# Patient Record
Sex: MALE | Race: WHITE | NOT HISPANIC OR LATINO | Employment: OTHER | URBAN - METROPOLITAN AREA
[De-identification: names, ages, dates, MRNs, and addresses within clinical notes are randomized per-mention and may not be internally consistent; named-entity substitution may affect disease eponyms.]

---

## 2017-02-28 ENCOUNTER — OFFICE VISIT (OUTPATIENT)
Dept: AUDIOLOGY | Facility: CLINIC | Age: 67
End: 2017-02-28

## 2017-02-28 DIAGNOSIS — H90.71 MIXED HEARING LOSS OF RIGHT EAR: ICD-10-CM

## 2017-02-28 DIAGNOSIS — H90.5 SENSORINEURAL HEARING LOSS OF LEFT EAR: ICD-10-CM

## 2017-05-24 ENCOUNTER — OFFICE VISIT (OUTPATIENT)
Dept: OPHTHALMOLOGY | Facility: CLINIC | Age: 67
End: 2017-05-24
Attending: OPHTHALMOLOGY
Payer: COMMERCIAL

## 2017-05-24 DIAGNOSIS — Z96.1 PSEUDOPHAKIA OF BOTH EYES: ICD-10-CM

## 2017-05-24 DIAGNOSIS — H52.13 MYOPIA WITH ASTIGMATISM AND PRESBYOPIA, BILATERAL: Primary | ICD-10-CM

## 2017-05-24 DIAGNOSIS — H52.203 MYOPIA WITH ASTIGMATISM AND PRESBYOPIA, BILATERAL: Primary | ICD-10-CM

## 2017-05-24 DIAGNOSIS — H52.4 MYOPIA WITH ASTIGMATISM AND PRESBYOPIA, BILATERAL: Primary | ICD-10-CM

## 2017-05-24 PROCEDURE — 99212 OFFICE O/P EST SF 10 MIN: CPT | Mod: ZF

## 2017-05-24 ASSESSMENT — REFRACTION_WEARINGRX
OS_CYLINDER: +0.50
OD_CYLINDER: +0.25
OD_SPHERE: -1.50
OS_AXIS: 180
OD_ADD: +2.50
OD_AXIS: 062
OS_ADD: +2.50
OS_SPHERE: -1.00

## 2017-05-24 ASSESSMENT — VISUAL ACUITY
METHOD: SNELLEN - LINEAR
OD_CC: 20/20
OS_CC: 20/20
METHOD_MR: DEFERS
CORRECTION_TYPE: GLASSES

## 2017-05-24 ASSESSMENT — TONOMETRY
OS_IOP_MMHG: 10
IOP_METHOD: TONOPEN
OD_IOP_MMHG: 12

## 2017-05-24 ASSESSMENT — CUP TO DISC RATIO
OS_RATIO: 0.2
OD_RATIO: 0.2

## 2017-05-24 ASSESSMENT — CONF VISUAL FIELD
OD_NORMAL: 1
OS_NORMAL: 1

## 2017-05-24 ASSESSMENT — SLIT LAMP EXAM - LIDS
COMMENTS: NORMAL
COMMENTS: NORMAL

## 2017-05-24 ASSESSMENT — EXTERNAL EXAM - LEFT EYE: OS_EXAM: NORMAL

## 2017-05-24 ASSESSMENT — EXTERNAL EXAM - RIGHT EYE: OD_EXAM: NORMAL

## 2017-05-24 NOTE — MR AVS SNAPSHOT
After Visit Summary   2017    Ciaran Dong    MRN: [id number]           Patient Information     Date Of Birth          1950        Visit Information        Provider Department      2017 8:45 AM [facility]        Today's Diagnoses     Myopia with astigmatism and presbyopia, bilateral    -  1    Pseudophakia of both eyes          Care Instructions    Future Appointments  Date Time Provider Department Center   2018 7:30 AM Eladia Molina MD [facility]               Follow-ups after your visit        Follow-up notes from your care team     Return in about 1 year (around 2018).      Who to contact     Please call your clinic at [phone] to:    Ask questions about your health    Make or cancel appointments    Discuss your medicines    Learn about your test results    Speak to your doctor   If you have compliments or concerns about an experience at your clinic, or if you wish to file a complaint, please contact [facility] Patient Relations at [phone] or email us at [email or web address]         Additional Information About Your Visit        MyChart Information     Mavatar is an electronic gateway that provides easy, online access to your medical records. With Mavatar, you can request a clinic appointment, read your test results, renew a prescription or communicate with your care team.     To sign up for Mavatar visit the website at [email or web address]   You will be asked to enter the access code listed below, as well as some personal information. Please follow the directions to create your username and password.     Your access code is: [email or web address]  Expires: 2017  4:52 PM     Your access code will  in 90 days. If you need help or a new code, please contact your [facility] or call [phone] for assistance.        Care EveryWhere ID     This is your  Care EveryWhere ID. This could be used by other organizations to access your [facility] medical records  [id number]         Blood Pressure from Last 3 Encounters:   04/24/15 115/82   04/03/15 109/74   01/22/15 118/72    Weight from Last 3 Encounters:   04/02/15 88.8 kg (195 lb 11.2 oz)   01/22/15 87.4 kg (192 lb 11.2 oz)   09/18/14 83.5 kg (184 lb)              Today, you had the following     No orders found for display         Today's Medication Changes          These changes are accurate as of: 5/24/17  9:28 AM.  If you have any questions, ask your nurse or doctor.               These medicines have changed or have updated prescriptions.        Dose/Directions    omeprazole 20 MG tablet   This may have changed:    - how much to take  - when to take this  - additional instructions   Used for:  Abdominal pain, epigastric        Dose:  20 mg   Take 1 tablet (20 mg) by mouth daily Take 30-60 minutes before a meal.   Quantity:  90 tablet   Refills:  3                Primary Care Provider Office Phone # Fax #    Ramon Díaz [phone]        [facility] [address]  [facility] [address]        Thank you!     Thank you for choosing [facility]  for your care. Our goal is always to provide you with excellent care. Hearing back from our patients is one way we can continue to improve our services. Please take a few minutes to complete the written survey that you may receive in the mail after your visit with us. Thank you!             Your Updated Medication List - Protect others around you: Learn how to safely use, store and throw away your medicines at www.disposemymeds.org.          This list is accurate as of: 5/24/17  9:28 AM.  Always use your most recent med list.                   Brand Name Dispense Instructions for use    * albuterol 108 (90 BASE) MCG/ACT Inhaler    PROAIR HFA/PROVENTIL HFA/VENTOLIN HFA    1 Inhaler    Inhale 2 puffs into the lungs every 6 hours as needed  for shortness of breath / dyspnea       * albuterol (5 MG/ML) 0.5% neb solution    PROVENTIL    20 mL    Take 0.5 mLs (2.5 mg) by nebulization every 6 hours as needed for wheezing or shortness of breath / dyspnea       * albuterol (2.5 MG/3ML) 0.083% neb solution     360 mL    Take 1 vial (2.5 mg) by nebulization every 6 hours as needed for shortness of breath / dyspnea or wheezing       alendronate 70 MG tablet    FOSAMAX    4 tablet    Take 1 tablet (70 mg) by mouth every 7 days Take with a full glass of water and do not eat or lay down for 30 minutes       ammonium lactate 12 % cream    AMLACTIN     Apply topically daily Daily after bath       ARTIFICIAL TEARS OP      Apply to eye as needed       beclomethasone 40 MCG/ACT Inhaler    QVAR    1 Inhaler    Inhale 2 puffs into the lungs 2 times daily       calcium carb 1250 mg (500 mg Council)/vitamin D 200 units 500-200 MG-UNIT per tablet    OSCAL with D     Take 1 tablet by mouth 2 times daily (with meals)       calcium carbonate 500 MG chewable tablet    TUMS    150 tablet    Take 1 tablet (500 mg) by mouth daily       diclofenac 1 % Gel topical gel    VOLTAREN    100 g    Apply 4 gm to affected area four times daily       econazole nitrate 1 % cream     85 g    To feet and toenails twice a day.       eucerin cream     60 g    Apply topically as needed for dry skin       fluocinonide 0.05 % ointment    LIDEX     Apply topically daily       fluticasone 220 MCG/ACT Inhaler    FLOVENT HFA    1 Inhaler    Inhale 2 puffs into the lungs 2 times daily       fluticasone-salmeterol 115-21 MCG/ACT Inhaler    ADVAIR-HFA     Inhale 2 puffs into the lungs daily       hydrocortisone 1 % cream    CORTAID    45 g    Apply topically 2 times daily       ipratropium - albuterol 0.5 mg/2.5 mg/3 mL 0.5-2.5 (3) MG/3ML neb solution    DUONEB         omeprazole 20 MG tablet     90 tablet    Take 1 tablet (20 mg) by mouth daily Take 30-60 minutes before a meal.       order for DME     1  Device    Equipment being ordered: 4-wheeled rolling walker       prednisoLONE acetate 1 % ophthalmic susp    PRED FORTE    2 Bottle    1 drop in surgical eye as directed, 4x daily after surgery for 1 week, 3x daily for 1 week, 2x daily for 1 week, daily for 1 week, then stop       THERAGRAN OR      Take by mouth daily Theragran -M       triamcinolone 0.1 % ointment    KENALOG    60 g    Apply topically 2 times daily To legs, abdomen, arms, and back       VITAMIN D (CHOLECALCIFEROL) PO      Take 50,000 Units by mouth Every week for 12 doses       * Notice:  This list has 3 medication(s) that are the same as other medications prescribed for you. Read the directions carefully, and ask your doctor or other care provider to review them with you.

## 2017-05-24 NOTE — PATIENT INSTRUCTIONS
Future Appointments  Date Time Provider Department Center   5/28/2018 7:30 AM [facility]

## 2017-05-24 NOTE — PROGRESS NOTES
HPI  Ciaran Dong is a 66 year old male here for yearly eye exam. He feels his vision is good, stable from last year, at near and distance. No eye pain, redness, discharge. No flashes/floaters.    POH: Pseudophakia OU (2015)  PMH: Hepatitis C, liver cirrhosis  FH: Mother with poor vision, he is unsure of cause  SH: Former smoker. He was deemed unable to complete a 6 month course of therapy for his Hepatitis C after neuropsych evaluation.     Assessment & Plan      (H52.203) Myopic astigmatism of both eyes  (primary encounter diagnosis)/(H52.4) Presbyopia  Comment: Good vision with current glasses. He does not drive.  Plan: Observe    (Z96.1) Pseudophakia, both eyes  Comment: Clear visual axis  Plan: Observe     -----------------------------------------------------------------------------------    Patient disposition:   Return in about 1 year (around 5/24/2018). or sooner as needed.    Teaching statement:  Complete documentation of historical and exam elements from today's encounter can be found in the full encounter summary report (not reduplicated in this progress note). I personally obtained the chief complaint(s) and history of present illness.  I confirmed and edited as necessary the review of systems, past medical/surgical history, family history, social history, and examination findings as documented by others; and I examined the patient myself. I personally reviewed the relevant tests, images, and reports as documented above.     I formulated and edited as necessary the assessment and plan and discussed the findings and management plan with the patient and family.      Eladia Molina MD  Comprehensive Ophthalmology & Ocular Pathology  Department of Ophthalmology and Visual Neurosciences  [email or web address]  Pager [phone]

## 2018-08-07 ENCOUNTER — OFFICE VISIT (OUTPATIENT)
Dept: URGENT CARE | Facility: CLINIC | Age: 68
End: 2018-08-07
Payer: MEDICARE

## 2018-08-07 VITALS
OXYGEN SATURATION: 92 % | BODY MASS INDEX: 26.32 KG/M2 | WEIGHT: 188 LBS | HEART RATE: 74 BPM | RESPIRATION RATE: 18 BRPM | HEIGHT: 71 IN | DIASTOLIC BLOOD PRESSURE: 58 MMHG | TEMPERATURE: 99.2 F | SYSTOLIC BLOOD PRESSURE: 102 MMHG

## 2018-08-07 DIAGNOSIS — J44.9 CHRONIC OBSTRUCTIVE PULMONARY DISEASE, UNSPECIFIED COPD TYPE (CMS/HCC): Primary | ICD-10-CM

## 2018-08-07 PROCEDURE — 99211 OFF/OP EST MAY X REQ PHY/QHP: CPT

## 2018-08-07 PROCEDURE — 94640 AIRWAY INHALATION TREATMENT: CPT | Performed by: NURSE PRACTITIONER

## 2018-08-07 PROCEDURE — 6360000200 HC RX 636 W HCPCS (ALT 250 FOR IP): Performed by: NURSE PRACTITIONER

## 2018-08-07 PROCEDURE — 99202 OFFICE O/P NEW SF 15 MIN: CPT | Mod: GF | Performed by: NURSE PRACTITIONER

## 2018-08-07 PROCEDURE — 99201 *NO LONGER ACTIVE 2021 DO NOT USE* HC LEVEL 1 OFFICE OUTPATIENT NEW 10 MINUTES: CPT | Mod: 25

## 2018-08-07 RX ORDER — FLUTICASONE PROPIONATE AND SALMETEROL XINAFOATE 115; 21 UG/1; UG/1
2 AEROSOL, METERED RESPIRATORY (INHALATION)
Qty: 8 G | Refills: 0 | Status: SHIPPED | OUTPATIENT
Start: 2018-08-07 | End: 2018-09-06

## 2018-08-07 RX ORDER — ALBUTEROL SULFATE 90 UG/1
2 INHALANT RESPIRATORY (INHALATION) EVERY 4 HOURS PRN
COMMUNITY
Start: 2018-04-18

## 2018-08-07 RX ORDER — BUDESONIDE 0.5 MG/2ML
INHALANT ORAL
COMMUNITY
Start: 2018-05-08

## 2018-08-07 RX ORDER — ALBUTEROL SULFATE 0.83 MG/ML
2.5 SOLUTION RESPIRATORY (INHALATION) ONCE
Status: COMPLETED | OUTPATIENT
Start: 2018-08-07 | End: 2018-08-07

## 2018-08-07 RX ORDER — TIOTROPIUM BROMIDE 18 UG/1
1 CAPSULE ORAL; RESPIRATORY (INHALATION) DAILY
COMMUNITY
Start: 2018-04-18

## 2018-08-07 RX ORDER — IPRATROPIUM BROMIDE AND ALBUTEROL SULFATE 2.5; .5 MG/3ML; MG/3ML
3 SOLUTION RESPIRATORY (INHALATION)
COMMUNITY
Start: 2018-04-18 | End: 2018-08-10 | Stop reason: SDUPTHER

## 2018-08-07 RX ORDER — ACETAMINOPHEN 500 MG
500 TABLET ORAL
COMMUNITY
Start: 2018-04-18

## 2018-08-07 RX ADMIN — ALBUTEROL SULFATE 2.5 MG: 2.5 SOLUTION RESPIRATORY (INHALATION) at 13:44

## 2018-08-07 NOTE — PROGRESS NOTES
Subjective      Pavan Metz is a 68 y.o. male who presents for a medication refill.    HPI  He is here for the rally and he ran out his Advair HFA inhaler.  He is wanting a refill on this.  He does give me a conflicting report on how he uses.  First he tells me he uses as needed when he is short of breath.  That he tells me he uses a 2 puffs twice a day.  He does have a cough.  He does have shortness of breath.  He does not want to be evaluated for any of this as he relates this to his COPD.  He is really just wanting a refill.  The following have been reviewed and updated as appropriate in this visit:  No text in SmartText        Allergies   Allergen Reactions   • Chicken Derived Swelling     Also allergy to other bird meat.   • Influenza Virus Vaccines Other (see comments)     Patient lost consciousness at home after receiving the immunization. He was hospitalized overnight.   • Venom-Honey Bee Anaphylaxis   • Aspirin, Buffered Other (see comments)     Skin flaking/ulcer   • Tolmetin GI intolerance     Current Outpatient Prescriptions   Medication Sig Dispense Refill   • acetaminophen (TYLENOL) 500 mg tablet Take 500 mg by mouth.     • albuterol HFA (PROVENTIL HFA;VENTOLIN HFA) 90 mcg/actuation inhaler Inhale 2 puffs.     • budesonide (PULMICORT) 0.5 mg/2 mL nebulizer solution Nebulize the contents of 1 vial twice daily - this is to replace your Advair Diskus and Advair HFA inhaler.     • ipratropium-albuterol (DUO-NEB) 0.5-2.5 mg/3 mL nebulizer solution 3 mL.     • tiotropium (SPIRIVA) 1 capsule = per inhalation capsule Place into inhaler and inhale.     • fluticasone-salmeterol (ADVAIR HFA) 115-21 mcg/actuation inhaler Inhale 2 puffs 2 (two) times a day. Rinse mouth with water after use. Do not swallow. 8 g 0     No current facility-administered medications for this visit.      History reviewed. No pertinent past medical history.  History reviewed. No pertinent surgical history.  History reviewed. No  "pertinent family history.  Social History     Social History   • Marital status: Single     Spouse name: N/A   • Number of children: N/A   • Years of education: N/A     Social History Main Topics   • Smoking status: Never Smoker   • Smokeless tobacco: Never Used   • Alcohol use No   • Drug use: No   • Sexual activity: Defer     Other Topics Concern   • None     Social History Narrative   • None       Review of Systems  As noted in HPI  Objective   /58 (BP Location: Left arm, Patient Position: Sitting, Cuff Size: Reg)   Pulse 74   Temp 37.3 °C (99.2 °F) (Temporal)   Resp 18   Ht 1.803 m (5' 11\")   Wt 85.3 kg (188 lb)   SpO2 92%   BMI 26.22 kg/m²     Physical Exam   Constitutional: He appears well-developed and well-nourished.   Cardiovascular: Normal rate and regular rhythm.    Pulmonary/Chest: He has wheezes in the right upper field, the right middle field, the right lower field, the left upper field, the left middle field and the left lower field.       Assessment/Plan   Diagnoses and all orders for this visit:    Chronic obstructive pulmonary disease, unspecified COPD type (CMS/[facility])  -     fluticasone-salmeterol (ADVAIR HFA) 115-21 mcg/actuation inhaler; Inhale 2 puffs 2 (two) times a day. Rinse mouth with water after use. Do not swallow.    I will go ahead and provide him a refill of his Advair inhaler.  I did offer chest x-ray and lab work as he is weaned.  He does not want to do any of this.  He did request to do a neb treatment.  We will go ahead and give an albuterol neb.  Wheezing improved.       AMERICA YODER, CNP  "

## 2018-08-10 ENCOUNTER — OFFICE VISIT (OUTPATIENT)
Dept: URGENT CARE | Facility: CLINIC | Age: 68
End: 2018-08-10
Payer: MEDICARE

## 2018-08-10 VITALS
DIASTOLIC BLOOD PRESSURE: 60 MMHG | OXYGEN SATURATION: 89 % | SYSTOLIC BLOOD PRESSURE: 82 MMHG | HEART RATE: 84 BPM | TEMPERATURE: 99.2 F

## 2018-08-10 DIAGNOSIS — I95.9 HYPOTENSION, UNSPECIFIED HYPOTENSION TYPE: ICD-10-CM

## 2018-08-10 DIAGNOSIS — R06.2 WHEEZING: Primary | ICD-10-CM

## 2018-08-10 DIAGNOSIS — J44.9 CHRONIC OBSTRUCTIVE PULMONARY DISEASE, UNSPECIFIED COPD TYPE (CMS/HCC): ICD-10-CM

## 2018-08-10 PROCEDURE — 99213 OFFICE O/P EST LOW 20 MIN: CPT | Mod: GF | Performed by: PHYSICIAN ASSISTANT

## 2018-08-10 PROCEDURE — 6360000200 HC RX 636 W HCPCS (ALT 250 FOR IP): Performed by: PHYSICIAN ASSISTANT

## 2018-08-10 PROCEDURE — 99211 OFF/OP EST MAY X REQ PHY/QHP: CPT

## 2018-08-10 PROCEDURE — 94640 AIRWAY INHALATION TREATMENT: CPT | Performed by: PHYSICIAN ASSISTANT

## 2018-08-10 RX ORDER — IPRATROPIUM BROMIDE AND ALBUTEROL SULFATE 2.5; .5 MG/3ML; MG/3ML
3 SOLUTION RESPIRATORY (INHALATION) EVERY 6 HOURS
Qty: 300 ML | Refills: 0 | Status: SHIPPED | OUTPATIENT
Start: 2018-08-10

## 2018-08-10 RX ORDER — ALBUTEROL SULFATE 0.83 MG/ML
2.5 SOLUTION RESPIRATORY (INHALATION) EVERY 4 HOURS PRN
Status: SHIPPED | OUTPATIENT
Start: 2018-08-10

## 2018-08-10 RX ADMIN — ALBUTEROL SULFATE 2.5 MG: 2.5 SOLUTION RESPIRATORY (INHALATION) at 12:47

## 2018-08-10 ASSESSMENT — ENCOUNTER SYMPTOMS
SHORTNESS OF BREATH: 1
CHILLS: 0
WHEEZING: 1
COUGH: 1
FEVER: 0

## 2018-08-10 NOTE — PROGRESS NOTES
Subjective      Pavan Metz is a 68 y.o. male who presents requesting refill of his DuoNeb solution.  He notes he took his last vial this morning.  He is here for the rally and has been staying in his car.  He denies any fever but notes a cough that is chronic for him.  He does have a significant smoking history.    HPI    The following have been reviewed and updated as appropriate in this visit:  No text in SmartText        Allergies   Allergen Reactions   • Chicken Derived Swelling     Also allergy to other bird meat.   • Influenza Virus Vaccines Other (see comments)     Patient lost consciousness at home after receiving the immunization. He was hospitalized overnight.   • Venom-Honey Bee Anaphylaxis   • Aspirin, Buffered Other (see comments)     Skin flaking/ulcer   • Tolmetin GI intolerance     Current Outpatient Prescriptions   Medication Sig Dispense Refill   • acetaminophen (TYLENOL) 500 mg tablet Take 500 mg by mouth.     • albuterol HFA (PROVENTIL HFA;VENTOLIN HFA) 90 mcg/actuation inhaler Inhale 2 puffs.     • budesonide (PULMICORT) 0.5 mg/2 mL nebulizer solution Nebulize the contents of 1 vial twice daily - this is to replace your Advair Diskus and Advair HFA inhaler.     • fluticasone-salmeterol (ADVAIR HFA) 115-21 mcg/actuation inhaler Inhale 2 puffs 2 (two) times a day. Rinse mouth with water after use. Do not swallow. 8 g 0   • ipratropium-albuterol (DUO-NEB) 0.5-2.5 mg/3 mL nebulizer solution Take 3 mL by nebulization every 6 (six) hours. 300 mL 0   • tiotropium (SPIRIVA) 1 capsule = per inhalation capsule Place into inhaler and inhale.       Current Facility-Administered Medications   Medication Dose Route Frequency Provider Last Rate Last Dose   • albuterol 2.5 mg/3 mL nebulizer solution 2.5 mg  2.5 mg nebulization q4h PRN MARCO A Singh   2.5 mg at 08/10/18 1247     No past medical history on file.  No past surgical history on file.  No family history on file.  Social History     Social  History   • Marital status: Single     Spouse name: N/A   • Number of children: N/A   • Years of education: N/A     Social History Main Topics   • Smoking status: Never Smoker   • Smokeless tobacco: Never Used   • Alcohol use No   • Drug use: No   • Sexual activity: Defer     Other Topics Concern   • Not on file     Social History Narrative   • No narrative on file       Review of Systems   Constitutional: Negative for chills and fever.   Respiratory: Positive for cough, shortness of breath (Chronic) and wheezing.    Cardiovascular: Negative for chest pain.       Objective   BP (!) 82/60 (BP Location: Right arm, Patient Position: Sitting, Cuff Size: Reg)   Pulse 84   Temp 37.3 °C (99.2 °F)   SpO2 (!) 89%     Physical Exam   Constitutional: He is oriented to person, place, and time. He appears well-developed and well-nourished.   HENT:   Head: Normocephalic and atraumatic.   Mouth/Throat: Oropharynx is clear and moist.   Eyes: Conjunctivae and EOM are normal. Pupils are equal, round, and reactive to light.   Neck: Normal range of motion. Neck supple.   Cardiovascular: Normal rate, regular rhythm and normal heart sounds.    Pulmonary/Chest: No respiratory distress. He has wheezes. He has rales (Coarse bilateral bases).   Lymphadenopathy:     He has no cervical adenopathy.   Neurological: He is alert and oriented to person, place, and time.   Vitals reviewed.      Assessment/Plan   Diagnoses and all orders for this visit:    Wheezing  -     albuterol 2.5 mg/3 mL nebulizer solution 2.5 mg; Take 3 mL (2.5 mg total) by nebulization every 4 (four) hours as needed for wheezing.     Chronic obstructive pulmonary disease, unspecified COPD type (CMS/[facility])  -     ipratropium-albuterol (DUO-NEB) 0.5-2.5 mg/3 mL nebulizer solution; Take 3 mL by nebulization every 6 (six) hours.    Hypotension, unspecified hypotension type    Did give Pavan a neb treatment in the clinic which did resolve his wheezing.  I did recheck his SPO2  following the neb and he is now 92% to 94% on room air.  I will go ahead and send refill of his duo nebs to [facility] per his request.  He is hypotensive today with blood pressure of 82/60.  On looking back in epic this is a chronic thing for him.  He has not having any symptoms such as lightheadedness.  He does have hep C and cirrhosis which is probably what is contributing to his hypotension.  I did discuss whether or not we should provide him with any IV fluids with Dr. Segura since he is asymptomatic there is no reason to do this at this time.  Advised him to keep himself well-hydrated.  Did send him with about a gallon of water so could have this in his car and he can refill this.  He is to return for recheck as needed.  He states understanding and agrees with treatment plan.    MARCO A RAMON

## 2019-06-26 ENCOUNTER — OFFICE VISIT (OUTPATIENT)
Dept: OPHTHALMOLOGY | Facility: CLINIC | Age: 69
End: 2019-06-26
Attending: OPHTHALMOLOGY
Payer: COMMERCIAL

## 2019-06-26 DIAGNOSIS — H52.13 MYOPIC ASTIGMATISM OF BOTH EYES: Primary | ICD-10-CM

## 2019-06-26 DIAGNOSIS — Z96.1 PSEUDOPHAKIA, BOTH EYES: ICD-10-CM

## 2019-06-26 DIAGNOSIS — H52.4 PRESBYOPIA: ICD-10-CM

## 2019-06-26 DIAGNOSIS — H52.203 MYOPIC ASTIGMATISM OF BOTH EYES: Primary | ICD-10-CM

## 2019-06-26 PROCEDURE — G0463 HOSPITAL OUTPT CLINIC VISIT: HCPCS | Mod: ZF

## 2019-06-26 ASSESSMENT — VISUAL ACUITY
METHOD: SNELLEN - LINEAR
OS_CC+: -3
OS_CC: 20/20
OD_CC: 20/20

## 2019-06-26 ASSESSMENT — TONOMETRY
OD_IOP_MMHG: 16
IOP_METHOD: TONOPEN
OS_IOP_MMHG: 14

## 2019-06-26 ASSESSMENT — REFRACTION_WEARINGRX
OD_CYLINDER: +0.25
OS_ADD: +2.50
OS_AXIS: 180
OD_ADD: +2.50
OD_SPHERE: -1.50
OS_SPHERE: -1.00
OS_CYLINDER: +0.50
OD_AXIS: 062

## 2019-06-26 ASSESSMENT — SLIT LAMP EXAM - LIDS
COMMENTS: NORMAL
COMMENTS: NORMAL

## 2019-06-26 ASSESSMENT — CONF VISUAL FIELD
OS_NORMAL: 1
METHOD: COUNTING FINGERS
OD_NORMAL: 1

## 2019-06-26 ASSESSMENT — EXTERNAL EXAM - LEFT EYE: OS_EXAM: NORMAL

## 2019-06-26 ASSESSMENT — EXTERNAL EXAM - RIGHT EYE: OD_EXAM: NORMAL

## 2019-06-26 ASSESSMENT — CUP TO DISC RATIO
OD_RATIO: 0.2
OS_RATIO: 0.2

## 2019-06-26 NOTE — NURSING NOTE
Chief Complaints and History of Present Illnesses   Patient presents with     Annual Eye Exam     Chief Complaint(s) and History of Present Illness(es)     Annual Eye Exam     Laterality: both eyes    Associated symptoms: Negative for dryness, eye pain, redness and tearing    Pain scale: 0/10              Comments     Patient returns to clinic for a follow up of Myopic astigmatism of both eyes.  He states that his vision has seemed clear in both eyes since his cataract surgeries.  Patient denies having any eye discomfort.    Nelly Salguero COT 2:06 PM June 26, 2019

## 2019-06-26 NOTE — PROGRESS NOTES
HPI  Ciaran Dong is a 68 year old male here for yearly eye exam. He feels his vision is good, stable from last year, at near and distance. No eye pain, redness, discharge. No flashes/floaters.    POH: Pseudophakia OU (2015)  PMH: Hepatitis C, liver cirrhosis  FH: Mother with poor vision, he is unsure of cause  SH: Former smoker. He was deemed unable to complete a 6 month course of therapy for his Hepatitis C after neuropsych evaluation.     Assessment & Plan      (H52.203) Myopic astigmatism of both eyes  (primary encounter diagnosis)/(H52.4) Presbyopia  Comment: Good vision with current glasses. He does not drive.  Plan: Observe    (Z96.1) Pseudophakia, both eyes  Comment: Clear visual axis  Plan: Observe     -----------------------------------------------------------------------------------    Patient disposition:   Return in about 1 year (around 6/26/2020). or sooner as needed.    Teaching statement:  Complete documentation of historical and exam elements from today's encounter can be found in the full encounter summary report (not reduplicated in this progress note). I personally obtained the chief complaint(s) and history of present illness.  I confirmed and edited as necessary the review of systems, past medical/surgical history, family history, social history, and examination findings as documented by others; and I examined the patient myself. I personally reviewed the relevant tests, images, and reports as documented above.     I formulated and edited as necessary the assessment and plan and discussed the findings and management plan with the patient and family.      Eladia Molina MD  Comprehensive Ophthalmology & Ocular Pathology  Department of Ophthalmology and Visual Neurosciences  [email or web address]  Pager [phone]

## 2019-08-06 ENCOUNTER — HOSPITAL ENCOUNTER (OUTPATIENT)
Facility: HOSPITAL | Age: 69
Setting detail: OBSERVATION
Discharge: HOME | End: 2019-08-07
Attending: EMERGENCY MEDICINE | Admitting: INTERNAL MEDICINE
Payer: MEDICARE

## 2019-08-06 ENCOUNTER — APPOINTMENT (OUTPATIENT)
Dept: RADIOLOGY | Facility: HOSPITAL | Age: 69
End: 2019-08-06
Payer: MEDICARE

## 2019-08-06 DIAGNOSIS — J44.1 COPD WITH ACUTE EXACERBATION (CMS/HCC): ICD-10-CM

## 2019-08-06 DIAGNOSIS — I95.9 HYPOTENSION: ICD-10-CM

## 2019-08-06 DIAGNOSIS — J44.1 COPD EXACERBATION (CMS/HCC): Primary | ICD-10-CM

## 2019-08-06 LAB
ALBUMIN SERPL-MCNC: 4 G/DL (ref 3.5–5.3)
ALP SERPL-CCNC: 40 U/L (ref 45–115)
ALT SERPL-CCNC: 10 U/L (ref 0–52)
ANION GAP SERPL CALC-SCNC: 7 MMOL/L (ref 3–11)
AST SERPL-CCNC: 16 U/L (ref 0–39)
BASOPHILS # BLD AUTO: 0 10*3/UL
BASOPHILS NFR BLD AUTO: 1 % (ref 0–2)
BILIRUB SERPL-MCNC: 0.83 MG/DL (ref 0–1.4)
BUN SERPL-MCNC: 20 MG/DL (ref 7–25)
CALCIUM ALBUM COR SERPL-MCNC: 9.4 MG/DL (ref 8.6–10.3)
CALCIUM SERPL-MCNC: 9.4 MG/DL (ref 8.6–10.3)
CHLORIDE SERPL-SCNC: 101 MMOL/L (ref 98–107)
CO2 SERPL-SCNC: 26 MMOL/L (ref 21–32)
CREAT SERPL-MCNC: 0.89 MG/DL (ref 0.7–1.3)
EOSINOPHIL # BLD AUTO: 0.1 10*3/UL
EOSINOPHIL NFR BLD AUTO: 4 % (ref 0–3)
ERYTHROCYTE [DISTWIDTH] IN BLOOD BY AUTOMATED COUNT: 14.2 % (ref 11.5–15)
GFR SERPL CREATININE-BSD FRML MDRD: 86 ML/MIN/1.73M*2
GLUCOSE SERPL-MCNC: 150 MG/DL (ref 70–105)
HCT VFR BLD AUTO: 38.2 % (ref 38–50)
HGB BLD-MCNC: 13 G/DL (ref 13.2–17.2)
LYMPHOCYTES # BLD AUTO: 1.4 10*3/UL
LYMPHOCYTES NFR BLD AUTO: 43 % (ref 15–47)
MAGNESIUM SERPL-MCNC: 1.8 MG/DL (ref 1.8–2.4)
MCH RBC QN AUTO: 31.5 PG (ref 29–34)
MCHC RBC AUTO-ENTMCNC: 34 G/DL (ref 32–36)
MCV RBC AUTO: 92.6 FL (ref 82–97)
MONOCYTES # BLD AUTO: 0.2 10*3/UL
MONOCYTES NFR BLD AUTO: 6 % (ref 5–13)
NEUTROPHILS # BLD AUTO: 1.5 10*3/UL
NEUTROPHILS NFR BLD AUTO: 47 % (ref 46–70)
PLATELET # BLD AUTO: 99 10*3/UL (ref 130–350)
PMV BLD AUTO: 7.2 FL (ref 6.9–10.8)
POTASSIUM SERPL-SCNC: 3.8 MMOL/L (ref 3.5–5.1)
PROT SERPL-MCNC: 7.3 G/DL (ref 6–8.3)
RBC # BLD AUTO: 4.12 10*6/ΜL (ref 4.1–5.8)
SODIUM SERPL-SCNC: 134 MMOL/L (ref 135–145)
TROPONIN I SERPL-MCNC: <0.03 NG/ML
WBC # BLD AUTO: 3.3 10*3/UL (ref 3.7–9.6)

## 2019-08-06 PROCEDURE — 2580000300 HC RX 258: Performed by: EMERGENCY MEDICINE

## 2019-08-06 PROCEDURE — 96374 THER/PROPH/DIAG INJ IV PUSH: CPT

## 2019-08-06 PROCEDURE — 36415 COLL VENOUS BLD VENIPUNCTURE: CPT | Performed by: EMERGENCY MEDICINE

## 2019-08-06 PROCEDURE — 83735 ASSAY OF MAGNESIUM: CPT | Performed by: EMERGENCY MEDICINE

## 2019-08-06 PROCEDURE — 71045 X-RAY EXAM CHEST 1 VIEW: CPT

## 2019-08-06 PROCEDURE — 85025 COMPLETE CBC W/AUTO DIFF WBC: CPT | Performed by: EMERGENCY MEDICINE

## 2019-08-06 PROCEDURE — 80053 COMPREHEN METABOLIC PANEL: CPT | Performed by: EMERGENCY MEDICINE

## 2019-08-06 PROCEDURE — 6360000200 HC RX 636 W HCPCS (ALT 250 FOR IP): Performed by: EMERGENCY MEDICINE

## 2019-08-06 PROCEDURE — 84443 ASSAY THYROID STIM HORMONE: CPT | Performed by: EMERGENCY MEDICINE

## 2019-08-06 PROCEDURE — 99285 EMERGENCY DEPT VISIT HI MDM: CPT | Performed by: EMERGENCY MEDICINE

## 2019-08-06 PROCEDURE — 6370000100 HC RX 637 (ALT 250 FOR IP): Performed by: EMERGENCY MEDICINE

## 2019-08-06 PROCEDURE — 94644 CONT INHLJ TX 1ST HOUR: CPT

## 2019-08-06 PROCEDURE — 84484 ASSAY OF TROPONIN QUANT: CPT | Performed by: EMERGENCY MEDICINE

## 2019-08-06 PROCEDURE — 93005 ELECTROCARDIOGRAM TRACING: CPT

## 2019-08-06 PROCEDURE — 94640 AIRWAY INHALATION TREATMENT: CPT

## 2019-08-06 RX ORDER — SODIUM CHLORIDE 9 MG/ML
1000 INJECTION, SOLUTION INTRAVENOUS ONCE
Status: COMPLETED | OUTPATIENT
Start: 2019-08-06 | End: 2019-08-07

## 2019-08-06 RX ORDER — IPRATROPIUM BROMIDE AND ALBUTEROL SULFATE 2.5; .5 MG/3ML; MG/3ML
3 SOLUTION RESPIRATORY (INHALATION) ONCE
Status: COMPLETED | OUTPATIENT
Start: 2019-08-06 | End: 2019-08-06

## 2019-08-06 RX ORDER — ALBUTEROL SULFATE 0.83 MG/ML
10 SOLUTION RESPIRATORY (INHALATION) ONCE
Status: COMPLETED | OUTPATIENT
Start: 2019-08-06 | End: 2019-08-06

## 2019-08-06 RX ADMIN — METHYLPREDNISOLONE SODIUM SUCCINATE 125 MG: 125 INJECTION, POWDER, FOR SOLUTION INTRAMUSCULAR; INTRAVENOUS at 22:27

## 2019-08-06 RX ADMIN — IPRATROPIUM BROMIDE AND ALBUTEROL SULFATE 3 ML: .5; 3 SOLUTION RESPIRATORY (INHALATION) at 22:00

## 2019-08-06 RX ADMIN — SODIUM CHLORIDE 1000 ML: 9 INJECTION, SOLUTION INTRAVENOUS at 23:49

## 2019-08-06 RX ADMIN — ALBUTEROL SULFATE 10 MG: 2.5 SOLUTION RESPIRATORY (INHALATION) at 22:00

## 2019-08-06 ASSESSMENT — HEART SCORE
HISTORY: SLIGHTLY SUSPICIOUS
AGE: >64
TROPONIN: LESS THAN OR EQUAL TO NORMAL LIMIT
RISK FACTORS: NO KNOWN RISK FACTORS
ECG: NORMAL
HEART SCORE: 2

## 2019-08-07 VITALS
SYSTOLIC BLOOD PRESSURE: 93 MMHG | BODY MASS INDEX: 23.1 KG/M2 | TEMPERATURE: 97.4 F | HEIGHT: 71 IN | OXYGEN SATURATION: 96 % | DIASTOLIC BLOOD PRESSURE: 54 MMHG | WEIGHT: 165 LBS | RESPIRATION RATE: 20 BRPM | HEART RATE: 78 BPM

## 2019-08-07 PROBLEM — Z87.19 HISTORY OF BARRETT'S ESOPHAGUS: Status: ACTIVE | Noted: 2019-08-07

## 2019-08-07 PROBLEM — Z99.81 ON HOME OXYGEN THERAPY: Status: ACTIVE | Noted: 2019-08-07

## 2019-08-07 PROBLEM — D69.6 THROMBOCYTOPENIA (CMS/HCC): Status: ACTIVE | Noted: 2019-08-07

## 2019-08-07 PROBLEM — J44.1 COPD WITH ACUTE EXACERBATION (CMS/HCC): Status: ACTIVE | Noted: 2019-08-07

## 2019-08-07 PROBLEM — J44.9 CHRONIC OBSTRUCTIVE PULMONARY DISEASE (CMS/HCC): Status: RESOLVED | Noted: 2018-08-10 | Resolved: 2019-08-07

## 2019-08-07 PROBLEM — D72.819 LEUKOPENIA: Status: ACTIVE | Noted: 2019-08-07

## 2019-08-07 PROBLEM — K74.60 LIVER CIRRHOSIS (CMS/HCC): Status: ACTIVE | Noted: 2019-08-07

## 2019-08-07 PROBLEM — Z86.19 HISTORY OF HEPATITIS C: Status: ACTIVE | Noted: 2019-08-07

## 2019-08-07 LAB
B PARAP IS1001 DNA NPH QL NAA+NON-PROBE: NEGATIVE
B PERT.PT PRMT NPH QL NAA+NON-PROBE: NEGATIVE
BACTERIA #/AREA URNS AUTO: NORMAL /HPF
BILIRUB UR QL STRIP.AUTO: NEGATIVE
C PNEUM DNA NPH QL NAA+NON-PROBE: NEGATIVE
CLARITY UR: CLEAR
COLOR UR: YELLOW
ERYTHROCYTE [DISTWIDTH] IN BLOOD BY AUTOMATED COUNT: 14.4 % (ref 11.5–15)
FLUAV RNA NPH QL NAA+NON-PROBE: NEGATIVE
FLUBV RNA NPH QL NAA+NON-PROBE: NEGATIVE
FOLATE SERPL-MCNC: 5.4 NG/ML
GLUCOSE UR STRIP.AUTO-MCNC: NEGATIVE MG/DL
HADV DNA NPH QL NAA+NON-PROBE: NEGATIVE
HCOV 229E RNA NPH QL NAA+NON-PROBE: NEGATIVE
HCOV HKU1 RNA NPH QL NAA+NON-PROBE: NEGATIVE
HCOV NL63 RNA NPH QL NAA+NON-PROBE: NEGATIVE
HCOV OC43 RNA NPH QL NAA+NON-PROBE: NEGATIVE
HCT VFR BLD AUTO: 40.4 % (ref 38–50)
HGB BLD-MCNC: 13.5 G/DL (ref 13.2–17.2)
HGB UR QL STRIP.AUTO: NEGATIVE
HMPV RNA NPH QL NAA+NON-PROBE: NEGATIVE
HPIV1 RNA NPH QL NAA+NON-PROBE: NEGATIVE
HPIV2 RNA NPH QL NAA+NON-PROBE: NEGATIVE
HPIV3 RNA NPH QL NAA+NON-PROBE: NEGATIVE
HPIV4 RNA NPH QL NAA+NON-PROBE: NEGATIVE
KETONES UR STRIP.AUTO-MCNC: NEGATIVE MG/DL
LACTATE BLDV-SCNC: 0.6 MMOL/L (ref 0.5–1.99)
LEUKOCYTE ESTERASE UR QL STRIP: NEGATIVE
LYMPHOCYTES # BLD MANUAL: 0.4 10*3/UL
LYMPHOCYTES NFR BLD MANUAL: 23 % (ref 15–47)
M PNEUMO DNA NPH QL NAA+NON-PROBE: NEGATIVE
MCH RBC QN AUTO: 31 PG (ref 29–34)
MCHC RBC AUTO-ENTMCNC: 33.5 G/DL (ref 32–36)
MCV RBC AUTO: 92.5 FL (ref 82–97)
MONOCYTES # BLD MANUAL: 0 10*3/UL
MONOCYTES NFR BLD MANUAL: 1 % (ref 5–13)
NEUTROPHILS # BLD MANUAL: 1.29 10*3/UL
NEUTS BAND # BLD MANUAL: 0.1 10*3/UL
NEUTS BAND NFR BLD MANUAL: 3 % (ref 0–10)
NEUTS SEG # BLD MANUAL: 1.2 10*3/UL
NEUTS SEG NFR BLD MANUAL: 73 % (ref 46–70)
NITRITE UR QL STRIP.AUTO: NEGATIVE
PH UR STRIP.AUTO: 6 PH
PLATELET # BLD AUTO: 91 10*3/UL (ref 130–350)
PLATELET # BLD EST: ABNORMAL 10*3/UL
PMV BLD AUTO: 7.3 FL (ref 6.9–10.8)
PROT UR STRIP.AUTO-MCNC: NEGATIVE MG/DL
RBC # BLD AUTO: 4.36 10*6/ΜL (ref 4.1–5.8)
RBC #/AREA URNS AUTO: NORMAL /HPF
RBC MORPH BLD: NORMAL
RSV RNA NPH QL NAA+NON-PROBE: NEGATIVE
RV+EV RNA NPH QL NAA+NON-PROBE: NEGATIVE
SP GR UR STRIP.AUTO: 1.01 (ref 1–1.03)
SQUAMOUS #/AREA URNS AUTO: NEGATIVE /HPF
TOTAL CELLS COUNTED BLD: 100 CELLS
TSH SERPL DL<=0.05 MIU/L-ACNC: 1.47 UIU/ML (ref 0.34–4.82)
UROBILINOGEN UR STRIP.AUTO-MCNC: <2 E.U./DL
VIT B12 SERPL-MCNC: 348 PG/ML (ref 180–914)
WBC # BLD AUTO: 1.7 10*3/UL (ref 3.7–9.6)
WBC #/AREA URNS AUTO: NORMAL /HPF
WBC NRBC COR # BLD: 1.7 10*3/UL

## 2019-08-07 PROCEDURE — 36415 COLL VENOUS BLD VENIPUNCTURE: CPT | Performed by: EMERGENCY MEDICINE

## 2019-08-07 PROCEDURE — 82607 VITAMIN B-12: CPT | Performed by: INTERNAL MEDICINE

## 2019-08-07 PROCEDURE — G0378 HOSPITAL OBSERVATION PER HR: HCPCS

## 2019-08-07 PROCEDURE — 99217 *NO LONGER ACTIVE 2023 DO NOT USE* PR OBSERVATION CARE DISCHARGE MANAGEMENT: CPT | Mod: NC | Performed by: INTERNAL MEDICINE

## 2019-08-07 PROCEDURE — 81001 URINALYSIS AUTO W/SCOPE: CPT | Performed by: INTERNAL MEDICINE

## 2019-08-07 PROCEDURE — 6360000200 HC RX 636 W HCPCS (ALT 250 FOR IP): Performed by: INTERNAL MEDICINE

## 2019-08-07 PROCEDURE — 6370000100 HC RX 637 (ALT 250 FOR IP): Performed by: INTERNAL MEDICINE

## 2019-08-07 PROCEDURE — 85025 COMPLETE CBC W/AUTO DIFF WBC: CPT | Performed by: INTERNAL MEDICINE

## 2019-08-07 PROCEDURE — 2590000100 HC RX 259: Performed by: INTERNAL MEDICINE

## 2019-08-07 PROCEDURE — 94640 AIRWAY INHALATION TREATMENT: CPT

## 2019-08-07 PROCEDURE — 99236 HOSP IP/OBS SAME DATE HI 85: CPT | Performed by: INTERNAL MEDICINE

## 2019-08-07 PROCEDURE — 87633 RESP VIRUS 12-25 TARGETS: CPT | Performed by: EMERGENCY MEDICINE

## 2019-08-07 PROCEDURE — 82746 ASSAY OF FOLIC ACID SERUM: CPT | Performed by: INTERNAL MEDICINE

## 2019-08-07 PROCEDURE — 2580000300 HC RX 258: Performed by: EMERGENCY MEDICINE

## 2019-08-07 PROCEDURE — 83605 ASSAY OF LACTIC ACID: CPT

## 2019-08-07 PROCEDURE — 96376 TX/PRO/DX INJ SAME DRUG ADON: CPT

## 2019-08-07 PROCEDURE — 2580000300 HC RX 258: Performed by: INTERNAL MEDICINE

## 2019-08-07 RX ORDER — OMEPRAZOLE 40 MG/1
40 CAPSULE, DELAYED RELEASE ORAL 2 TIMES DAILY
COMMUNITY
Start: 2012-11-17

## 2019-08-07 RX ORDER — ALUMINUM HYDROXIDE, MAGNESIUM HYDROXIDE, AND SIMETHICONE 1200; 120; 1200 MG/30ML; MG/30ML; MG/30ML
30 SUSPENSION ORAL 3 TIMES DAILY PRN
Status: DISCONTINUED | OUTPATIENT
Start: 2019-08-07 | End: 2019-08-07 | Stop reason: HOSPADM

## 2019-08-07 RX ORDER — AZITHROMYCIN 250 MG/1
250 TABLET, FILM COATED ORAL DAILY
Qty: 500 TABLET | Refills: 0 | Status: SHIPPED | OUTPATIENT
Start: 2019-08-07 | End: 2019-08-10

## 2019-08-07 RX ORDER — GUAIFENESIN 600 MG/1
1200 TABLET, EXTENDED RELEASE ORAL 2 TIMES DAILY
Status: DISCONTINUED | OUTPATIENT
Start: 2019-08-07 | End: 2019-08-07 | Stop reason: HOSPADM

## 2019-08-07 RX ORDER — TAMSULOSIN HYDROCHLORIDE 0.4 MG/1
0.4 CAPSULE ORAL DAILY
COMMUNITY
Start: 2019-05-24

## 2019-08-07 RX ORDER — FLUTICASONE PROPIONATE 220 UG/1
2 AEROSOL, METERED RESPIRATORY (INHALATION) 2 TIMES DAILY
COMMUNITY
Start: 2015-11-24

## 2019-08-07 RX ORDER — FLUTICASONE PROPIONATE AND SALMETEROL XINAFOATE 115; 21 UG/1; UG/1
2 AEROSOL, METERED RESPIRATORY (INHALATION) DAILY
COMMUNITY

## 2019-08-07 RX ORDER — SODIUM CHLORIDE 9 MG/ML
1000 INJECTION, SOLUTION INTRAVENOUS ONCE
Status: COMPLETED | OUTPATIENT
Start: 2019-08-07 | End: 2019-08-07

## 2019-08-07 RX ORDER — FINASTERIDE 5 MG/1
5 TABLET, FILM COATED ORAL DAILY
COMMUNITY
Start: 2019-05-24

## 2019-08-07 RX ORDER — FLUOCINONIDE 0.5 MG/G
1 OINTMENT TOPICAL DAILY
COMMUNITY

## 2019-08-07 RX ORDER — ECONAZOLE NITRATE 10 MG/G
1 CREAM TOPICAL 2 TIMES DAILY
COMMUNITY
Start: 2013-09-03

## 2019-08-07 RX ORDER — ALENDRONATE SODIUM 70 MG/1
70 TABLET ORAL WEEKLY
COMMUNITY
Start: 2015-12-28

## 2019-08-07 RX ORDER — ONDANSETRON HYDROCHLORIDE 2 MG/ML
4 INJECTION, SOLUTION INTRAVENOUS EVERY 6 HOURS PRN
Status: DISCONTINUED | OUTPATIENT
Start: 2019-08-07 | End: 2019-08-07 | Stop reason: HOSPADM

## 2019-08-07 RX ORDER — ENOXAPARIN SODIUM 100 MG/ML
40 INJECTION SUBCUTANEOUS
Status: DISCONTINUED | OUTPATIENT
Start: 2019-08-07 | End: 2019-08-07 | Stop reason: HOSPADM

## 2019-08-07 RX ORDER — LEVALBUTEROL 1.25 MG/.5ML
1.25 SOLUTION, CONCENTRATE RESPIRATORY (INHALATION)
Status: DISCONTINUED | OUTPATIENT
Start: 2019-08-07 | End: 2019-08-07 | Stop reason: HOSPADM

## 2019-08-07 RX ORDER — AZITHROMYCIN 250 MG/1
500 TABLET, FILM COATED ORAL DAILY
Status: DISCONTINUED | OUTPATIENT
Start: 2019-08-07 | End: 2019-08-07 | Stop reason: HOSPADM

## 2019-08-07 RX ORDER — ONDANSETRON 4 MG/1
4 TABLET, FILM COATED ORAL EVERY 6 HOURS PRN
Status: DISCONTINUED | OUTPATIENT
Start: 2019-08-07 | End: 2019-08-07 | Stop reason: HOSPADM

## 2019-08-07 RX ORDER — LEVALBUTEROL 1.25 MG/.5ML
1.25 SOLUTION, CONCENTRATE RESPIRATORY (INHALATION) EVERY 4 HOURS PRN
Status: DISCONTINUED | OUTPATIENT
Start: 2019-08-07 | End: 2019-08-07 | Stop reason: HOSPADM

## 2019-08-07 RX ORDER — CEFUROXIME AXETIL 500 MG/1
500 TABLET ORAL 2 TIMES DAILY
Status: DISCONTINUED | OUTPATIENT
Start: 2019-08-07 | End: 2019-08-07 | Stop reason: HOSPADM

## 2019-08-07 RX ORDER — PREDNISONE 20 MG/1
40 TABLET ORAL DAILY
Status: DISCONTINUED | OUTPATIENT
Start: 2019-08-08 | End: 2019-08-07 | Stop reason: HOSPADM

## 2019-08-07 RX ORDER — DEXTROSE MONOHYDRATE AND SODIUM CHLORIDE 5; .9 G/100ML; G/100ML
125 INJECTION, SOLUTION INTRAVENOUS CONTINUOUS
Status: DISCONTINUED | OUTPATIENT
Start: 2019-08-07 | End: 2019-08-07 | Stop reason: HOSPADM

## 2019-08-07 RX ORDER — IPRATROPIUM BROMIDE 0.5 MG/2.5ML
0.5 SOLUTION RESPIRATORY (INHALATION)
Status: DISCONTINUED | OUTPATIENT
Start: 2019-08-07 | End: 2019-08-07 | Stop reason: HOSPADM

## 2019-08-07 RX ORDER — DICLOFENAC SODIUM 10 MG/G
4 GEL TOPICAL 4 TIMES DAILY
COMMUNITY
Start: 2014-05-29

## 2019-08-07 RX ORDER — ADHESIVE BANDAGE
30 BANDAGE TOPICAL DAILY PRN
Status: DISCONTINUED | OUTPATIENT
Start: 2019-08-07 | End: 2019-08-07 | Stop reason: HOSPADM

## 2019-08-07 RX ORDER — GUAIFENESIN 1200 MG/1
1200 TABLET, EXTENDED RELEASE ORAL 2 TIMES DAILY
COMMUNITY
Start: 2017-06-13

## 2019-08-07 RX ORDER — ERGOCALCIFEROL 1.25 MG/1
50000 CAPSULE ORAL WEEKLY
COMMUNITY
Start: 2019-05-24

## 2019-08-07 RX ORDER — PREDNISONE 20 MG/1
20 TABLET ORAL DAILY
Status: DISCONTINUED | OUTPATIENT
Start: 2019-08-11 | End: 2019-08-07 | Stop reason: HOSPADM

## 2019-08-07 RX ORDER — POLYETHYLENE GLYCOL 3350 17 G/17G
17 POWDER, FOR SOLUTION ORAL DAILY
COMMUNITY
Start: 2019-05-24

## 2019-08-07 RX ORDER — POLYETHYLENE GLYCOL (3350) 17 G/17G
17 POWDER, FOR SOLUTION ORAL DAILY
Status: DISCONTINUED | OUTPATIENT
Start: 2019-08-07 | End: 2019-08-07 | Stop reason: HOSPADM

## 2019-08-07 RX ORDER — IPRATROPIUM BROMIDE 0.5 MG/2.5ML
0.5 SOLUTION RESPIRATORY (INHALATION) EVERY 4 HOURS PRN
Status: DISCONTINUED | OUTPATIENT
Start: 2019-08-07 | End: 2019-08-07 | Stop reason: HOSPADM

## 2019-08-07 RX ORDER — PREDNISONE 20 MG/1
40 TABLET ORAL DAILY
Qty: 6 TABLET | Refills: 0 | Status: SHIPPED | OUTPATIENT
Start: 2019-08-08 | End: 2019-08-11

## 2019-08-07 RX ORDER — CALCIUM CARBONATE 500(1250)
500 TABLET,CHEWABLE ORAL 2 TIMES DAILY
COMMUNITY
Start: 2015-05-20

## 2019-08-07 RX ORDER — CHOLECALCIFEROL (VITAMIN D3) 25 MCG
1000 TABLET ORAL DAILY
Status: DISCONTINUED | OUTPATIENT
Start: 2019-08-07 | End: 2019-08-07 | Stop reason: HOSPADM

## 2019-08-07 RX ADMIN — METHYLPREDNISOLONE SODIUM SUCCINATE 40 MG: 40 INJECTION, POWDER, FOR SOLUTION INTRAMUSCULAR; INTRAVENOUS at 06:46

## 2019-08-07 RX ADMIN — SODIUM CHLORIDE 1000 ML: 9 INJECTION, SOLUTION INTRAVENOUS at 01:34

## 2019-08-07 RX ADMIN — CEFUROXIME AXETIL 500 MG: 500 TABLET ORAL at 06:46

## 2019-08-07 RX ADMIN — DEXTROSE AND SODIUM CHLORIDE 125 ML/HR: 5; 900 INJECTION, SOLUTION INTRAVENOUS at 06:12

## 2019-08-07 RX ADMIN — LEVALBUTEROL 1.25 MG: 1.25 SOLUTION, CONCENTRATE RESPIRATORY (INHALATION) at 08:15

## 2019-08-07 RX ADMIN — AZITHROMYCIN 500 MG: 250 TABLET, FILM COATED ORAL at 06:46

## 2019-08-07 RX ADMIN — IPRATROPIUM BROMIDE 0.5 MG: 0.5 SOLUTION RESPIRATORY (INHALATION) at 08:14

## 2019-08-07 RX ADMIN — SODIUM CHLORIDE 1000 ML: 9 INJECTION, SOLUTION INTRAVENOUS at 04:19

## 2019-08-07 RX ADMIN — MELATONIN 1000 UNITS: at 09:57

## 2019-08-07 RX ADMIN — MULTIPLE VITAMINS W/ MINERALS TAB 1 TABLET: TAB at 09:57

## 2019-08-07 RX ADMIN — ALUMINUM HYDROXIDE, MAGNESIUM HYDROXIDE, AND SIMETHICONE 30 ML: 200; 200; 20 SUSPENSION ORAL at 09:56

## 2019-08-07 RX ADMIN — GUAIFENESIN 1200 MG: 600 TABLET, EXTENDED RELEASE ORAL at 09:57

## 2019-08-07 NOTE — H&P
History and Physical    08/07/19  2:24 AM    Chief Complaint   Patient presents with   • Shortness of Breath     here from [place] didnt bring any of his neb machine with him.. started having breathing problems shortly after leaving [place] used his rescue a couple times during bus trip.. was at [facility] and mobile medic called for AMB to bring pt here.. lungs sound very wet       HPI  Patient is a 69 y.o. male with significant PMH of home oxygen dependent COPD, liver cirrhosis likely due to hepatitis C, leukopenia, thrombocytopenia and others as listed below was brought into ER via ambulance with complaint of severe shortness of breath for last few hours.  Patient is traveling in this area for rally from [place].  He forgot to bring his nebulizer machine with him.  He developed progressive shortness of breath which became severe in few hours.  He also has some cough.  Patient is more sleepy at this time and he is not too much interested in interaction.  He is just answering my question by nodding his head.  He is not in any acute distress at this time.  Upon arrival in ER, his blood pressure was 88/54 which went up to 100/54 after 2 L  normal saline bolus in ER.  It seems that his BP runs on lower side with systolic in 100.  I had reviewed his chart from care everywhere.  Otherwise, he denies chest pain, vomiting, abdominal pain, diarrhea constipation.  Significant lab: WC count 3300, hemoglobin 13, platelet count 99,000, blood sugar 150, creatinine 0.89, LFTs within normal limits, magnesium 1.8, lactate level 0.60.  Chest x-ray negative for any acute infiltrates.  EKG shows 71 bpm in sinus rhythm.  No acute ST-T changes.  DuoNeb nebulizer treatment, Solu-Medrol 125 mg IV and normal saline 2 L bolus given in ER.    Past Medical History:   Diagnosis Date   • Guillen's esophagus    • Chronic low back pain    • COPD (chronic obstructive pulmonary disease) (CMS/HCC) (HCC)     On home oxygen.   • Ex-smoker for more  than 1 year    • Hepatitis C     With elevated LFTs   • Leukopenia    • Liver cirrhosis (CMS/HCC) (HCC)    • On home oxygen therapy    • Peripheral vascular disease (CMS/HCC) (HCC)    • Thrombocytopenia (CMS/HCC) (HCC)        Past Surgical History:   Procedure Laterality Date   • BACK SURGERY      Lumbar spine surgery to remove bone chips.   • BACK SURGERY     • CATARACT EXTRACTION W/  INTRAOCULAR LENS IMPLANT Left    • INGUINAL HERNIA REPAIR         Family History   Problem Relation Age of Onset   • No Known Problems Mother    • No Known Problems Father        Social History     Socioeconomic History   • Marital status: Single     Spouse name: Not on file   • Number of children: Not on file   • Years of education: Not on file   • Highest education level: Not on file   Occupational History   • Not on file   Social Needs   • Financial resource strain: Not on file   • Food insecurity:     Worry: Not on file     Inability: Not on file   • Transportation needs:     Medical: Not on file     Non-medical: Not on file   Tobacco Use   • Smoking status: Former Smoker   • Smokeless tobacco: Never Used   • Tobacco comment: Former heavy smoker   Substance and Sexual Activity   • Alcohol use: No   • Drug use: No   • Sexual activity: Defer   Lifestyle   • Physical activity:     Days per week: Not on file     Minutes per session: Not on file   • Stress: Not on file   Relationships   • Social connections:     Talks on phone: Not on file     Gets together: Not on file     Attends Voodoo service: Not on file     Active member of club or organization: Not on file     Attends meetings of clubs or organizations: Not on file     Relationship status: Not on file   • Intimate partner violence:     Fear of current or ex partner: Not on file     Emotionally abused: Not on file     Physically abused: Not on file     Forced sexual activity: Not on file   Other Topics Concern   • Not on file   Social History Narrative   • Not on file        Allergies   Allergen Reactions   • Chicken Derived Swelling     Also allergy to other bird meat.   • Influenza Virus Vaccines Other (see comments)     Patient lost consciousness at home after receiving the immunization. He was hospitalized overnight.   • Venom-Honey Bee Anaphylaxis   • Aspirin, Buffered Other (see comments)     Skin flaking/ulcer   • Tolmetin GI intolerance       Current Facility-Administered Medications   Medication Dose Route Frequency Provider Last Rate Last Dose   • sodium chloride 0.9 % bolus 1,000 mL  1,000 mL intravenous Once Davon Walsh MD 1,000 mL/hr at 08/07/19 0134 1,000 mL at 08/07/19 0134   • albuterol 2.5 mg/3 mL nebulizer solution 2.5 mg  2.5 mg nebulization q4h PRN MARCO A Singh   2.5 mg at 08/10/18 1247     Current Outpatient Medications   Medication Sig Dispense Refill   • ipratropium-albuterol (DUO-NEB) 0.5-2.5 mg/3 mL nebulizer solution Take 3 mL by nebulization every 6 (six) hours. 300 mL 0   • acetaminophen (TYLENOL) 500 mg tablet Take 500 mg by mouth.     • albuterol HFA (PROVENTIL HFA;VENTOLIN HFA) 90 mcg/actuation inhaler Inhale 2 puffs.     • budesonide (PULMICORT) 0.5 mg/2 mL nebulizer solution Nebulize the contents of 1 vial twice daily - this is to replace your Advair Diskus and Advair HFA inhaler.     • tiotropium (SPIRIVA) 1 capsule = per inhalation capsule Place into inhaler and inhale.         Prior to Admission medications    Medication Sig Start Date End Date Taking? Authorizing Provider   ipratropium-albuterol (DUO-NEB) 0.5-2.5 mg/3 mL nebulizer solution Take 3 mL by nebulization every 6 (six) hours. 8/10/18   MARCO A Singh   acetaminophen (TYLENOL) 500 mg tablet Take 500 mg by mouth. 4/18/18   Historical Provider, MD   albuterol HFA (PROVENTIL HFA;VENTOLIN HFA) 90 mcg/actuation inhaler Inhale 2 puffs. 4/18/18   Historical Provider, MD   budesonide (PULMICORT) 0.5 mg/2 mL nebulizer solution Nebulize the contents of 1 vial twice daily - this  is to replace your Advair Diskus and Advair HFA inhaler. 5/8/18   Historical Provider, MD   tiotropium (SPIRIVA) 1 capsule = per inhalation capsule Place into inhaler and inhale. 4/18/18   Historical Provider, MD       Review of Systems  10 point review of systems have been reviewed and are grossly unremarkable except those which have been mentioned in the history of present illness above otherwise negative.    Temp:  [36.4 °C (97.5 °F)-36.9 °C (98.4 °F)] 36.4 °C (97.5 °F)  Heart Rate:  [62-96] 80  Resp:  [18-24] 20  BP: ()/(45-95) 90/52    No intake/output data recorded.    Physical Exam   Constitutional: He is oriented to person, place, and time. He appears well-developed.   Nonobese, thin built and not in acute distress.   HENT:   Head: Normocephalic and atraumatic.   Eyes: Pupils are equal, round, and reactive to light. Conjunctivae and EOM are normal.   Neck: Normal range of motion. Neck supple.   Cardiovascular: Normal rate, regular rhythm, normal heart sounds and intact distal pulses.   Pulmonary/Chest: No stridor. He is in respiratory distress. He has wheezes. He has rales.   Abdominal: Soft. Bowel sounds are normal. He exhibits no distension. There is no tenderness. There is no guarding.   Musculoskeletal: Normal range of motion. He exhibits no edema or tenderness.   Neurological: He is alert and oriented to person, place, and time. He exhibits normal muscle tone.   Skin: Skin is warm and dry. No erythema.   Psychiatric: He has a normal mood and affect. His behavior is normal.         Basic:   Lab Results   Component Value Date     (L) 08/06/2019    K 3.8 08/06/2019     08/06/2019    CO2 26 08/06/2019    BUN 20 08/06/2019    CREATININE 0.89 08/06/2019    GLUCOSE 150 (H) 08/06/2019    CALCIUM 9.4 08/06/2019     Adult ABG: No results found for: PHART, SGA3YVZ, PO2ART, UAA6PKC, BEART, S7DEMUPP, HGBART, HVK1VOG  CBC with Platelet:    Lab Results   Component Value Date    WBC 3.3 (L) 08/06/2019     HGB 13.0 (L) 08/06/2019    HCT 38.2 08/06/2019    PLT 99 (L) 08/06/2019    RBC 4.12 08/06/2019    MCV 92.6 08/06/2019    MCH 31.5 08/06/2019    MCHC 34.0 08/06/2019    RDW 14.2 08/06/2019    MPV 7.2 08/06/2019     Comp:   Lab Results   Component Value Date     (L) 08/06/2019    K 3.8 08/06/2019     08/06/2019    CO2 26 08/06/2019    BUN 20 08/06/2019    CREATININE 0.89 08/06/2019    GLUCOSE 150 (H) 08/06/2019    CALCIUM 9.4 08/06/2019    PROT 7.3 08/06/2019    ALBUMIN 4.0 08/06/2019    AST 16 08/06/2019    ALT 10 08/06/2019    ALKPHOS 40 (L) 08/06/2019    BILITOT 0.83 08/06/2019     Coags: No results found for: PT, APTT, INR  Magnesium:   Lab Results   Component Value Date    MG 1.8 08/06/2019     U/A Macroscopic:  No results found for: COLORU, APPEAR, SPECGRAVU, EDEN, LEUKOCYTESU, NITRITEU, PROTUR, GLUCOSEU, KETONESU, UROBILINOGEN, BLOODU  Blood (Aerobic and Anaerobic):  No results found for: BLOODCX  Cerebrospinal Fluid (CSF): No results found for: CSFCX  Urine: No results found for: URINECX    Xr Chest Portable 1 View    Result Date: 8/6/2019  Narrative: XR CHEST 1 VIEW 08/06/2019 HISTORY:  Shortness of breath TECHNIQUE:  Chest, 1 view. COMPARISON:  None. FINDINGS: The lungs are clear. No pleural effusions. No pneumothorax. The heart size is normal. The mediastinal contour is normal.     Impression: IMPRESSION:  1.  No evidence of an acute pulmonary process.      Principal Problem:    COPD with acute exacerbation (CMS/HCC) (HCC)  Active Problems:    Hypotension    On home oxygen therapy    Leukopenia    Thrombocytopenia (CMS/HCC) (HCC)    History of hepatitis C    Liver cirrhosis (CMS/HCC) (HCC)    History of Guillen's esophagus      Assessment and Plan:    1.  COPD with acute exacerbation on home oxygen therapy.  Chest x-ray negative for any acute infiltrates.  He has leukopenia but afebrile and lactate level is normal.  Plan admit to medical floor.  Gentle IV fluid hydration.  Sputum  culture.  DuoNeb nebulizer treatment standing dose and as needed.  Steroids IV with tapering doses.  Ceftin and Zithromax p.o. antibiotics empirically for now.  Oxygen supplementation by nasal cannula to keep saturation more than 92%.  Monitor vitals are blood pressure closely.    2.  Leukopenia and thrombocytopenia, likely due to history of alcoholic cirrhosis and hepatitis C.  No evidence of infection at this time.  He is afebrile and chest x-ray shows no any acute infiltrates.  CBC in the morning.    3.  History of hepatitis C and liver cirrhosis.  Stable and no active issue at this time.    DVT prophylaxis: Lovenox subcu daily.    Plan of care discussed with the patient in detail and patient verbalized understanding.    Further medical care will be resumed by day hospitalist in the morning per hospital policy.    Total time spent in reviewing the chart, writing the orders and face-to-face evaluation of the patient is 70 minutes out of which more than 50% of the time was spent in face-to-face counseling the patient and coordinating his care. I have also discussed in entirety with ER physician regarding the management of the patient.    A voice recognition program was used to aid in documentation of this record.  Sometimes words are not printed exactly as they were spoken.  While efforts were made to carefully edit and correct any inaccuracies, some areas may be present; please take these into context.  Please contact the provider if areas are identified.    LARRY FELIZ MD

## 2019-08-07 NOTE — DISCHARGE SUMMARY
Inpatient Discharge Summary    BRIEF OVERVIEW    Admitting Provider: Kala Munguia MD  Discharge Provider: Herbie Jacques MD  Consultations:   Primary Care Physician at Discharge: Pcp No None     Admission Date: 8/6/2019     Discharge Date: 8/7/2019    Primary Discharge Diagnosis  COPD with acute exacerbation (CMS/HCC) (HCC)      Secondary Discharge Diagnosis  COPD on home oxygen, liver cirrhosis likely 50% to Hep C , leukopenia, thrombocytopenia  Problem List Items Addressed This Visit        Respiratory    * (Principal) COPD with acute exacerbation (CMS/HCC) (HCC)    Relevant Medications    fluticasone HFA (FLOVENT HFA) 220 mcg/actuation inhaler    fluticasone propion-salmeterol (ADVAIR HFA) 115-21 mcg/actuation inhaler    beclomethasone (QVAR) 40 mcg/actuation inhaler    guaiFENesin 1,200 mg tablet extended release 12hr    azithromycin (ZITHROMAX) 250 mg tablet    predniSONE (DELTASONE) 20 mg tablet (Start on 8/8/2019)       Circulatory    Hypotension      Other Visit Diagnoses     COPD exacerbation (CMS/HCC) ([facility])    -  Primary    Relevant Medications    fluticasone HFA (FLOVENT HFA) 220 mcg/actuation inhaler    fluticasone propion-salmeterol (ADVAIR HFA) 115-21 mcg/actuation inhaler    beclomethasone (QVAR) 40 mcg/actuation inhaler    guaiFENesin 1,200 mg tablet extended release 12hr    predniSONE (DELTASONE) 20 mg tablet (Start on 8/8/2019)          Discharge Disposition  Discharged to home  Code Status at Discharge: Full Code     Discharge medication list      START taking these medications      Instructions   azithromycin 250 mg tablet  Commonly known as:  ZITHROMAX   Take 1 tablet (250 mg total) by mouth daily for 3 days Take 2 tablets PO the first day, then 1 tablet PO daily for 4 days     predniSONE 20 mg tablet  Commonly known as:  DELTASONE  Start taking on:  8/8/2019   Take 2 tablets (40 mg total) by mouth daily for 3 doses        CONTINUE taking these medications      Instructions   acetaminophen  500 mg tablet  Commonly known as:  TYLENOL      albuterol HFA 90 mcg/actuation inhaler  Commonly known as:  PROVENTIL HFA;VENTOLIN HFA      alendronate 70 mg tablet  Commonly known as:  FOSAMAX      beclomethasone 40 mcg/actuation inhaler  Commonly known as:  QVAR      budesonide 0.5 mg/2 mL nebulizer solution  Commonly known as:  PULMICORT      calcium carbonate 500 mg calcium (1,250 mg) chewable tablet  Commonly known as:  OS-JACY      diclofenac sodium 1 % gel  Commonly known as:  VOLTAREN      econazole nitrate 1 % cream  Commonly known as:  ECOZA      ergocalciferol 50,000 unit capsule  Commonly known as:  VITAMIN D2      finasteride 5 mg tablet  Commonly known as:  PROSCAR      fluocinonide 0.05 % ointment  Commonly known as:  LIDEX      fluticasone  mcg/actuation inhaler  Commonly known as:  FLOVENT HFA      fluticasone propion-salmeterol 115-21 mcg/actuation inhaler  Commonly known as:  ADVAIR HFA      guaiFENesin 1,200 mg tablet extended release 12hr      ipratropium-albuterol 0.5-2.5 mg/3 mL nebulizer solution  Commonly known as:  DUO-NEB   Take 3 mL by nebulization every 6 (six) hours.     omeprazole 40 mg capsule  Commonly known as:  PriLOSEC      OXYCODONE ORAL      polyethylene glycol 17 gram/dose powder  Commonly known as:  GLYCOLAX      tamsulosin 0.4 mg capsule  Commonly known as:  FLOMAX      tiotropium 1 capsule = per inhalation capsule  Commonly known as:  SPIRIVA            Where to Get Your Medications      These medications were sent to [facility] - [place], [place] - [address] [address]    Phone:  [phone]   · azithromycin 250 mg tablet  · predniSONE 20 mg tablet         Important tests on this admission:  XR chest portable 1 view   Final Result   IMPRESSION:        1.  No evidence of an acute pulmonary process.        Xr Chest Portable 1 View    Result Date: 8/6/2019  Narrative: XR CHEST 1 VIEW 08/06/2019 HISTORY:   Shortness of breath TECHNIQUE:  Chest, 1 view. COMPARISON:  None. FINDINGS: The lungs are clear. No pleural effusions. No pneumothorax. The heart size is normal. The mediastinal contour is normal.     Impression: IMPRESSION:  1.  No evidence of an acute pulmonary process.        Results from last 4 days   Lab Units 08/07/19  0650 08/06/19  2138   WBC AUTO 10*3/uL 1.7* 3.3*   HEMOGLOBIN g/dL 13.5 13.0*   HEMATOCRIT % 40.4 38.2   PLATELETS AUTO 10*3/uL 91* 99*     Results from last 4 days   Lab Units 08/06/19  2138   SODIUM mmol/L 134*   POTASSIUM mmol/L 3.8   CHLORIDE mmol/L 101   CO2 mmol/L 26   BUN mg/dL 20   CREATININE mg/dL 0.89   CALCIUM mg/dL 9.4   TOTAL PROTEIN g/dL 7.3   BILIRUBIN TOTAL mg/dL 0.83   ALK PHOS U/L 40*   ALT U/L 10   AST U/L 16   GLUCOSE mg/dL 150*         Results from last 4 days   Lab Units 08/06/19  2138   TROPONIN I ng/mL <0.030       Lab Results   Component Value Date    [id number] 348 08/07/2019     Results from last 4 days   Lab Units 08/06/19  2138   TSH uIU/ml 1.474         DETAILS OF HOSPITAL STAY    Short HPI and Hospital Course  69-year-old male with PMH of oxygen dependent COPD, liver cirrhosis likely due to hepatitis C, leukopenia, thrombocytopenia presented to ED because of severe shortness of breath for last few hours before admission.  Patient is traveling in this area for relief from [place] he forgot to bring his nebulizer machine with him and develop progressive shortness of breath and cough so much so that he has to come to the ED upon arrival in the ED he was found to be hypotensive blood pressure 8854 got 2 units of NS, he was tachycardic, lab work shows WBC 3.3, H&H 13, platelet 99, creatinine 1.89, LFTs within normal limits chest x-ray negative EKG normal sinus rhythm patient was given duo nebs nebulizer, IV Solu-Medrol and admission was requested.  During hospitalization patient was placed on Ceftin and Zithromax, gentle IV fluids, duo nebs and steroids he responded  very well to the treatment and was discharged in the evening.  Upon discharge patient was also given oral steroids, and oral antibiotic    Discharge recommendations  Follow with the primary care physician post hospital discharge follow-up  Keep yourself well hydrated avoid any dehydration  Compliance with your medication is extremely important to control your signs symptoms  Do not miss your medication     Physical Exam at Discharge  Discharge Condition: fair  Heart Rate: 78  Resp: 20  BP: 93/54  Temp: 36.3 °C (97.4 °F)  Weight: 74.8 kg (165 lb)    Physical exam  69-year-old male lying comfortably in the bed in a very pleasant mood  HEENT head atraumatic, no pallor, no JVD  Chest clear to auscultation   CVS S1-S2 audible no added sound   abdomen soft, nontender, bowel sounds present  CNS alert awake orientation x3 moving both upper and lower extremity  Genitourinary deferred  Musculoskeletal no obvious joint abnormity seen  Psychiatric normal mood and affect    Time spent coordinating discharge: 45 minutes  A voice recognition program was used to aid in documentation of this record.  Sometime words are not presented exactly as they were spoken while efforts were made to carefully edit and correct any inaccuracies, some errors may be present .  please take this into context.  Please contact the provider if errors are identified    SHAMEKA COLE MD

## 2019-08-07 NOTE — PLAN OF CARE
Problem: Respiratory - Adult  Goal: Achieves optimal ventilation and oxygenation  Description  INTERVENTIONS:  1. Assess for changes in respiratory status  2. Assess for changes in mentation and behavior  3. Position to facilitate oxygenation and minimize respiratory effort  4. Oxygen supplementation based on oxygen saturation or ABGs  5. Assess patient's ability to cough effectively  6. Encourage broncho-pulmonary hygiene including cough, deep breathe  7. Assess the need for suctioning   8. Assess and instruct to report SOB or any respiratory difficulty  9. Respiratory Therapy support as indicated, including medications and treatment.  Outcome: Progressing

## 2019-08-07 NOTE — DISCHARGE INSTRUCTIONS
Follow with the primary care physician post hospital discharge follow-up  Keep yourself well hydrated avoid any dehydration  Compliance with your medication is extremely important to control your signs symptoms  Do not miss your medication

## 2019-08-07 NOTE — ED PROVIDER NOTES
CC: shortness of breath  HPI:    Patient is a 69 year old male presenting to the ED via EMS with constant and severe shortness of breath about 5 hours ago. Patient used an inhaler with no relief. It was relieved with nebulizer treatment in the ED. Patient is from [place] and states he did not bring his nebulizer machine with him. Patient is not normally on supplemented oxygen. He has a history of COPD.      Past Medical History:   Diagnosis Date   • COPD (chronic obstructive pulmonary disease) (CMS/HCC) (Formerly [facility])        Past Surgical History:   Procedure Laterality Date   • BACK SURGERY         Social History     Socioeconomic History   • Marital status: Single     Spouse name: Not on file   • Number of children: Not on file   • Years of education: Not on file   • Highest education level: Not on file   Occupational History   • Not on file   Social Needs   • Financial resource strain: Not on file   • Food insecurity:     Worry: Not on file     Inability: Not on file   • Transportation needs:     Medical: Not on file     Non-medical: Not on file   Tobacco Use   • Smoking status: Never Smoker   • Smokeless tobacco: Never Used   Substance and Sexual Activity   • Alcohol use: No   • Drug use: No   • Sexual activity: Defer   Lifestyle   • Physical activity:     Days per week: Not on file     Minutes per session: Not on file   • Stress: Not on file   Relationships   • Social connections:     Talks on phone: Not on file     Gets together: Not on file     Attends Christianity service: Not on file     Active member of club or organization: Not on file     Attends meetings of clubs or organizations: Not on file     Relationship status: Not on file   • Intimate partner violence:     Fear of current or ex partner: Not on file     Emotionally abused: Not on file     Physically abused: Not on file     Forced sexual activity: Not on file   Other Topics Concern   • Not on file   Social History Narrative   • Not on file       History  reviewed. No pertinent family history.    Allergies   Allergen Reactions   • Chicken Derived Swelling     Also allergy to other bird meat.   • Influenza Virus Vaccines Other (see comments)     Patient lost consciousness at home after receiving the immunization. He was hospitalized overnight.   • Venom-Honey Bee Anaphylaxis   • Aspirin, Buffered Other (see comments)     Skin flaking/ulcer   • Tolmetin GI intolerance         Current Outpatient Medications:   •  ipratropium-albuterol (DUO-NEB) 0.5-2.5 mg/3 mL nebulizer solution, Take 3 mL by nebulization every 6 (six) hours., Disp: 300 mL, Rfl: 0  •  acetaminophen (TYLENOL) 500 mg tablet, Take 500 mg by mouth., Disp: , Rfl:   •  albuterol HFA (PROVENTIL HFA;VENTOLIN HFA) 90 mcg/actuation inhaler, Inhale 2 puffs., Disp: , Rfl:   •  budesonide (PULMICORT) 0.5 mg/2 mL nebulizer solution, Nebulize the contents of 1 vial twice daily - this is to replace your Advair Diskus and Advair HFA inhaler., Disp: , Rfl:   •  tiotropium (SPIRIVA) 1 capsule = per inhalation capsule, Place into inhaler and inhale., Disp: , Rfl:       ROS:    Constitutional: negative for fever  Eyes: negative for blurred vision  ENT: negative for sore throat  Cardiovascular: negative for chest pain  Respiratory: positive for shortness of breath  GI: negative for abdominal pain, negative for vomiting  : negative for burning with urination  Musculoskeletal: negative for new joint pain  Neuro: negative for headache  Rheumatologic: negative for new joint pain        ED Triage Vitals   Temp Heart Rate Resp BP SpO2   08/06/19 2128 08/06/19 2128 08/06/19 2128 08/06/19 2128 08/06/19 2128   36.9 °C (98.4 °F) 72 24 119/81 97 %      Temp Source Heart Rate Source Patient Position BP Location FiO2 (%)   08/06/19 2128 -- 08/06/19 2146 -- --   Oral  Sitting           Physical Exam:    Physical Exam  Nursing note and vitals reviewed.  Constitutional: No acute distress. Cachetic.   HENT: Conjunctiva is normal and  non-injected. Mucous membranes moist.   Eyes: Pupils are equal, round, and reactive to light.   Neck: Trachea midline.   Cardiovascular: Regular rate and rhythm. Normal pulses.   Pulmonary/Chest: Decreased breath sounds on the right.   Abdominal: Non-tender. Non-distended.  Musculoskeletal: No edema.   Neurological: Alert. No gross weakness.   Skin: Skin is warm and dry. No rash noted.   Psychiatric: Normal affect.    Labs Reviewed   CBC WITH AUTO DIFFERENTIAL - Abnormal       Result Value    WBC 3.3 (*)     RBC 4.12      Hemoglobin 13.0 (*)     Hematocrit 38.2      MCV 92.6      MCH 31.5      MCHC 34.0      RDW 14.2      Platelets 99 (*)     MPV 7.2      Neutrophils% 47      Lymphocytes% 43      Monocytes% 6      Eosinophils% 4 (*)     Basophils% 1      Neutrophils Absolute 1.50      Lymphocytes Absolute 1.40      Monocytes Absolute 0.20      Eosinophils Absolute 0.10      Basophils Absolute 0.00     COMPREHENSIVE METABOLIC PANEL - Abnormal    Sodium 134 (*)     Potassium 3.8      Chloride 101      CO2 26      Anion Gap 7      BUN 20      Creatinine 0.89      Glucose 150 (*)     Calcium 9.4      AST 16      ALT (SGPT) 10      Alkaline Phosphatase 40 (*)     Total Protein 7.3      Albumin 4.0      Total Bilirubin 0.83      eGFR 86      Corrected Calcium 9.4      Narrative:     Estimated GFR calculated using the 2009 CKD-EPI creatinine equation.   MAGNESIUM - Normal    Magnesium 1.8     TROPONIN I - Normal    Troponin I <0.030     POCT LACTIC ACID (LACTATE)   TSH   RESPIRATORY PATHOGEN PANEL, PCR       XR chest portable 1 view   Final Result   IMPRESSION:        1.  No evidence of an acute pulmonary process.            Procedures:  ECG 12 lead - Shortness of breath  Date/Time: 8/6/2019 11:15 PM  Performed by: Davon Walsh MD  Authorized by: Davon Walsh MD     Rate:     ECG rate:  75  Rhythm:     Rhythm: sinus rhythm    Ectopy:     Ectopy: none    QRS:     QRS axis:  Normal  ST segments:     ST segments:  Normal  T  waves:     T waves: normal    Other findings:     Other findings comment:  Impressions: Normal 12 lead EKG.         MDM:    Patient is presenting to the ED with signs and symptoms of COPD exacerbation. Patient was given 2L of IV bolus, Duo-neb, and Prednisone. Upon re-evaluation he does not feel better and he now has the chills. A rectal temperature will be obtained. Chest x-ray is negative. He has had intermittent hypotension in the ED. Lactic is normal. PCR is pending upon admission. Case was discussed with hospitalist, Dr. Munguia, who agrees to assess and hospitalize the patient.      Clinical Impression:  Final diagnoses:   [J44.1] COPD exacerbation (CMS/HCC) (Formerly [facility])       By signing my name, I, Lida Smalls, attest that this documentation has been prepared under the direction and in the presence of the attending physician, 8/7/2019, 1:37 AM.      I, Dr. Walsh personally performed the services described in this document as written by the scribe in my presence.  It has been reviewed and is both accurate and complete.    A voice recognition program was used to aid in the documentation of this record. Sometimes words are not printed exactly as they were spoken. While efforts were made to carefully edit and correct any inaccuracies, some errors may be present; please take these into context. Please contact the provider if errors are identified.      Davon Walsh MD  08/10/19 [id number]

## 2019-08-08 ENCOUNTER — HOSPITAL ENCOUNTER (EMERGENCY)
Facility: HOSPITAL | Age: 69
Discharge: HOME | End: 2019-08-08
Attending: EMERGENCY MEDICINE | Admitting: EMERGENCY MEDICINE
Payer: MEDICARE

## 2019-08-08 ENCOUNTER — APPOINTMENT (OUTPATIENT)
Dept: RADIOLOGY | Facility: HOSPITAL | Age: 69
End: 2019-08-08
Attending: EMERGENCY MEDICINE
Payer: MEDICARE

## 2019-08-08 VITALS
OXYGEN SATURATION: 93 % | RESPIRATION RATE: 22 BRPM | HEART RATE: 70 BPM | TEMPERATURE: 97.5 F | SYSTOLIC BLOOD PRESSURE: 116 MMHG | DIASTOLIC BLOOD PRESSURE: 84 MMHG

## 2019-08-08 DIAGNOSIS — J44.1 COPD EXACERBATION (CMS/HCC): Primary | ICD-10-CM

## 2019-08-08 DIAGNOSIS — R06.02 SHORTNESS OF BREATH: ICD-10-CM

## 2019-08-08 LAB
ALBUMIN SERPL-MCNC: 3.5 G/DL (ref 3.5–5.3)
ALP SERPL-CCNC: 40 U/L (ref 45–115)
ALT SERPL-CCNC: 10 U/L (ref 0–52)
ANION GAP SERPL CALC-SCNC: 8 MMOL/L (ref 3–11)
AST SERPL-CCNC: 17 U/L (ref 0–39)
BASOPHILS # BLD AUTO: 0 10*3/UL
BASOPHILS NFR BLD AUTO: 1 % (ref 0–2)
BILIRUB SERPL-MCNC: 0.54 MG/DL (ref 0–1.4)
BUN SERPL-MCNC: 19 MG/DL (ref 7–25)
CALCIUM ALBUM COR SERPL-MCNC: 9.1 MG/DL (ref 8.6–10.3)
CALCIUM SERPL-MCNC: 8.7 MG/DL (ref 8.6–10.3)
CHLORIDE SERPL-SCNC: 108 MMOL/L (ref 98–107)
CO2 SERPL-SCNC: 23 MMOL/L (ref 21–32)
CREAT SERPL-MCNC: 0.77 MG/DL (ref 0.7–1.3)
EOSINOPHIL # BLD AUTO: 0 10*3/UL
EOSINOPHIL NFR BLD AUTO: 1 % (ref 0–3)
ERYTHROCYTE [DISTWIDTH] IN BLOOD BY AUTOMATED COUNT: 14.5 % (ref 11.5–15)
ETHANOL SERPL-MCNC: <10 MG/DL (ref 0–10)
GFR SERPL CREATININE-BSD FRML MDRD: 91 ML/MIN/1.73M*2
GLUCOSE SERPL-MCNC: 110 MG/DL (ref 70–105)
HCT VFR BLD AUTO: 35.1 % (ref 38–50)
HGB BLD-MCNC: 12 G/DL (ref 13.2–17.2)
LYMPHOCYTES # BLD AUTO: 1.6 10*3/UL
LYMPHOCYTES NFR BLD AUTO: 40 % (ref 15–47)
MCH RBC QN AUTO: 31.4 PG (ref 29–34)
MCHC RBC AUTO-ENTMCNC: 34.3 G/DL (ref 32–36)
MCV RBC AUTO: 91.6 FL (ref 82–97)
MONOCYTES # BLD AUTO: 0.2 10*3/UL
MONOCYTES NFR BLD AUTO: 5 % (ref 5–13)
NEUTROPHILS # BLD AUTO: 2.2 10*3/UL
NEUTROPHILS NFR BLD AUTO: 53 % (ref 46–70)
PLATELET # BLD AUTO: 97 10*3/UL (ref 130–350)
PMV BLD AUTO: 8.4 FL (ref 6.9–10.8)
POTASSIUM SERPL-SCNC: 3.7 MMOL/L (ref 3.5–5.1)
PROT SERPL-MCNC: 6.6 G/DL (ref 6–8.3)
RBC # BLD AUTO: 3.83 10*6/ΜL (ref 4.1–5.8)
SODIUM SERPL-SCNC: 139 MMOL/L (ref 135–145)
TROPONIN I SERPL-MCNC: <0.03 NG/ML
WBC # BLD AUTO: 4 10*3/UL (ref 3.7–9.6)

## 2019-08-08 PROCEDURE — 71045 X-RAY EXAM CHEST 1 VIEW: CPT

## 2019-08-08 PROCEDURE — 99284 EMERGENCY DEPT VISIT MOD MDM: CPT | Mod: 25 | Performed by: EMERGENCY MEDICINE

## 2019-08-08 PROCEDURE — 96375 TX/PRO/DX INJ NEW DRUG ADDON: CPT

## 2019-08-08 PROCEDURE — 84484 ASSAY OF TROPONIN QUANT: CPT | Performed by: EMERGENCY MEDICINE

## 2019-08-08 PROCEDURE — 96365 THER/PROPH/DIAG IV INF INIT: CPT

## 2019-08-08 PROCEDURE — G0480 DRUG TEST DEF 1-7 CLASSES: HCPCS | Mod: GZ | Performed by: EMERGENCY MEDICINE

## 2019-08-08 PROCEDURE — 6360000200 HC RX 636 W HCPCS (ALT 250 FOR IP): Performed by: EMERGENCY MEDICINE

## 2019-08-08 PROCEDURE — 93010 ELECTROCARDIOGRAM REPORT: CPT | Performed by: INTERNAL MEDICINE

## 2019-08-08 PROCEDURE — 99285 EMERGENCY DEPT VISIT HI MDM: CPT | Performed by: EMERGENCY MEDICINE

## 2019-08-08 PROCEDURE — 80320 DRUG SCREEN QUANTALCOHOLS: CPT | Mod: GZ | Performed by: EMERGENCY MEDICINE

## 2019-08-08 PROCEDURE — 80053 COMPREHEN METABOLIC PANEL: CPT | Performed by: EMERGENCY MEDICINE

## 2019-08-08 PROCEDURE — 6370000100 HC RX 637 (ALT 250 FOR IP): Performed by: EMERGENCY MEDICINE

## 2019-08-08 PROCEDURE — 93005 ELECTROCARDIOGRAM TRACING: CPT | Performed by: EMERGENCY MEDICINE

## 2019-08-08 PROCEDURE — 94640 AIRWAY INHALATION TREATMENT: CPT

## 2019-08-08 PROCEDURE — 36415 COLL VENOUS BLD VENIPUNCTURE: CPT | Performed by: EMERGENCY MEDICINE

## 2019-08-08 PROCEDURE — 85025 COMPLETE CBC W/AUTO DIFF WBC: CPT | Performed by: EMERGENCY MEDICINE

## 2019-08-08 PROCEDURE — 99284 EMERGENCY DEPT VISIT MOD MDM: CPT | Performed by: EMERGENCY MEDICINE

## 2019-08-08 PROCEDURE — 2580000300 HC RX 258: Performed by: EMERGENCY MEDICINE

## 2019-08-08 RX ORDER — IPRATROPIUM BROMIDE AND ALBUTEROL SULFATE 2.5; .5 MG/3ML; MG/3ML
3 SOLUTION RESPIRATORY (INHALATION) ONCE
Status: COMPLETED | OUTPATIENT
Start: 2019-08-08 | End: 2019-08-08

## 2019-08-08 RX ORDER — IPRATROPIUM BROMIDE 0.5 MG/2.5ML
0.5 SOLUTION RESPIRATORY (INHALATION) ONCE
Status: COMPLETED | OUTPATIENT
Start: 2019-08-08 | End: 2019-08-08

## 2019-08-08 RX ORDER — PREDNISONE 10 MG/1
1 TABLET ORAL ONCE
Status: COMPLETED | OUTPATIENT
Start: 2019-08-08 | End: 2019-08-08

## 2019-08-08 RX ORDER — AZITHROMYCIN 250 MG
1 TABLET ORAL ONCE
Status: COMPLETED | OUTPATIENT
Start: 2019-08-08 | End: 2019-08-08

## 2019-08-08 RX ORDER — LEVALBUTEROL 1.25 MG/.5ML
1.25 SOLUTION, CONCENTRATE RESPIRATORY (INHALATION) ONCE
Status: COMPLETED | OUTPATIENT
Start: 2019-08-08 | End: 2019-08-08

## 2019-08-08 RX ORDER — FENTANYL CITRATE/PF 50 MCG/ML
50 PLASTIC BAG, INJECTION (ML) INTRAVENOUS ONCE
Status: COMPLETED | OUTPATIENT
Start: 2019-08-08 | End: 2019-08-08

## 2019-08-08 RX ORDER — ALBUTEROL SULFATE 90 UG/1
2 INHALANT RESPIRATORY (INHALATION) ONCE
Status: COMPLETED | OUTPATIENT
Start: 2019-08-08 | End: 2019-08-08

## 2019-08-08 RX ADMIN — IPRATROPIUM BROMIDE AND ALBUTEROL SULFATE 3 ML: .5; 3 SOLUTION RESPIRATORY (INHALATION) at 06:55

## 2019-08-08 RX ADMIN — FENTANYL CITRATE 50 MCG: 50 INJECTION INTRAMUSCULAR; INTRAVENOUS at 06:08

## 2019-08-08 RX ADMIN — CEFTRIAXONE 1000 MG: 1 INJECTION, POWDER, FOR SOLUTION INTRAMUSCULAR; INTRAVENOUS at 05:45

## 2019-08-08 RX ADMIN — IPRATROPIUM BROMIDE 0.5 MG: 0.5 SOLUTION RESPIRATORY (INHALATION) at 05:27

## 2019-08-08 RX ADMIN — PREDNISONE 1 PACKAGE: 10 TABLET ORAL at 07:10

## 2019-08-08 RX ADMIN — Medication 1 PACKAGE: at 07:10

## 2019-08-08 RX ADMIN — METHYLPREDNISOLONE SODIUM SUCCINATE 125 MG: 125 INJECTION, POWDER, FOR SOLUTION INTRAMUSCULAR; INTRAVENOUS at 05:35

## 2019-08-08 RX ADMIN — ALBUTEROL SULFATE 2 PUFF: 90 AEROSOL, METERED RESPIRATORY (INHALATION) at 07:10

## 2019-08-08 RX ADMIN — LEVALBUTEROL 1.25 MG: 1.25 SOLUTION, CONCENTRATE RESPIRATORY (INHALATION) at 05:27

## 2019-08-08 ASSESSMENT — ENCOUNTER SYMPTOMS
VOMITING: 0
BACK PAIN: 1
WHEEZING: 0
SHORTNESS OF BREATH: 1
TROUBLE SWALLOWING: 0
LIGHT-HEADEDNESS: 0
EYE REDNESS: 0
COUGH: 1
DIFFICULTY URINATING: 0
FEVER: 0
NAUSEA: 0
ABDOMINAL PAIN: 0
PALPITATIONS: 0

## 2019-08-08 NOTE — DISCHARGE INSTRUCTIONS
You may use your inhaler 2 to 4 puffs every 4-6 hours as needed for breathing.  Start your steroid prescription tomorrow (prednisone) take 2 Zithromax pills today then 1 daily for 4 days.  Follow-up with your regular doctor in 5 to 7 days.  Return to emergency if breathing worsens.

## 2019-08-08 NOTE — ED PROVIDER NOTES
"    HPI:  Chief Complaint   Patient presents with   • Cough     pt here from [place], was hospitalized at [facility] overnight for shortness of breath with discharged yesterday. Presents to ER tonight with a persistent cough since 1am and shortness of breath. States he has been taking meds he was discharged from hospital with.  Pt received albuterol neb in ambulance and states this helped   • Shortness of Breath       This gentleman who is visiting from [place] is [place] for complaint of difficulty breathing.  He states that approximately 1 AM while laying in his cot in his tent he began having increased difficulty breathing.  He has a history of COPD and normally uses an albuterol nebulizer 2-3 times a day as needed for dyspnea.  Unfortunately, he left the nebulizer in his home state of [place] prior to coming here because it was too heavy to carry with him.  He has had a cough, productive of yellowish sputum but no hemoptysis.  He was just discharged yesterday from [facility] where he was hospitalized for dyspnea.  He states he sometimes uses a rescue albuterol inhaler but does not have 1 of those either telling me that his current inhaler is \"empty\".  He states he did not get one after his hospital discharge.  Also, patient states he did not have time to  his prescriptions of Zithromax and prednisone from the pharmacy after his hospital discharge.  Although it is listed on his chart that he is on home oxygen patient tells me that he is not currently on any home oxygen therapies.  He has otherwise been afebrile, denies any shaking chills, chest pain or leg swelling.  Patient was given 1 albuterol nebulized treatment while in route by EMS.          HISTORY:  Past Medical History:   Diagnosis Date   • Guillen's esophagus    • Chronic low back pain    • COPD (chronic obstructive pulmonary disease) (CMS/[facility]) ([facility])     On home oxygen.   • Ex-smoker for more " than 1 year    • Hepatitis C     With elevated LFTs   • Leukopenia    • Liver cirrhosis (CMS/HCC) (HCC)    • On home oxygen therapy    • Peripheral vascular disease (CMS/HCC) (HCC)    • Thrombocytopenia (CMS/HCC) (HCC)        Past Surgical History:   Procedure Laterality Date   • BACK SURGERY      Lumbar spine surgery to remove bone chips.   • BACK SURGERY     • CATARACT EXTRACTION W/  INTRAOCULAR LENS IMPLANT Left    • INGUINAL HERNIA REPAIR         Family History   Problem Relation Age of Onset   • No Known Problems Mother    • No Known Problems Father        Social History     Tobacco Use   • Smoking status: Former Smoker   • Smokeless tobacco: Never Used   • Tobacco comment: Former heavy smoker   Substance Use Topics   • Alcohol use: No   • Drug use: No         ROS:  Review of Systems   Constitutional: Negative for fever.   HENT: Negative for nosebleeds and trouble swallowing.    Eyes: Negative for redness.   Respiratory: Positive for cough and shortness of breath. Negative for wheezing.    Cardiovascular: Negative for chest pain, palpitations and leg swelling.   Gastrointestinal: Negative for abdominal pain, nausea and vomiting.   Genitourinary: Negative for difficulty urinating.   Musculoskeletal: Positive for back pain (  chronically).   Skin: Negative for rash.   Neurological: Negative for light-headedness.   Psychiatric/Behavioral: Negative for suicidal ideas.       PE:  ED Triage Vitals [08/08/19 0502]   Temp Heart Rate Resp BP SpO2   36.4 °C (97.5 °F) 70 22 116/84 93 %      Temp Source Heart Rate Source Patient Position BP Location FiO2 (%)   Oral -- -- -- --       Physical Exam   Constitutional: He is oriented to person, place, and time.   Somewhat frail-appearing, cachectic appears older than stated age   HENT:   Head: Normocephalic and atraumatic.   Eyes: Pupils are equal, round, and reactive to light.   Neck: Normal range of motion. Neck supple. No tracheal deviation present.   Cardiovascular: Normal  rate and normal heart sounds.   No murmur heard.  Pulmonary/Chest: Effort normal. No accessory muscle usage or stridor. No respiratory distress. He has decreased breath sounds. He has no wheezes. He has no rales. He exhibits no tenderness.   Abdominal: Soft. Bowel sounds are normal. There is no tenderness.   Musculoskeletal:        Right lower leg: Normal.        Left lower leg: Normal.   Neurological: He is alert and oriented to person, place, and time.   Psychiatric: He has a normal mood and affect.   Nursing note and vitals reviewed.      ED LABS:  Labs Reviewed   CBC WITH AUTO DIFFERENTIAL - Abnormal       Result Value    WBC 4.0      RBC 3.83 (*)     Hemoglobin 12.0 (*)     Hematocrit 35.1 (*)     MCV 91.6      MCH 31.4      MCHC 34.3      RDW 14.5      Platelets 97 (*)     MPV 8.4      Neutrophils% 53      Lymphocytes% 40      Monocytes% 5      Eosinophils% 1      Basophils% 1      Neutrophils Absolute 2.20      Lymphocytes Absolute 1.60      Monocytes Absolute 0.20      Eosinophils Absolute 0.00      Basophils Absolute 0.00      Narrative:     R-@ PREVIOUSLY LOW   TROPONIN I - Normal    Troponin I <0.030     COMPREHENSIVE METABOLIC PANEL   ETHANOL         ED IMAGES:  XR chest portable 1 view   ED Interpretation   COPD changes, no interval change from 08/06/2019.  No infiltrates or effusion          ED PROCEDURES:  ECG 12 lead  Date/Time: 8/8/2019 5:31 AM  Performed by: Wallace Mays MD  Authorized by: Wallace Mays MD     Interpretation:     Interpretation: non-specific    Comments:      Sinus rhythm rate 68 with non-specific intraventricular conduction delay and low voltage probable pulmonary pattern, no acute ST or T wave changes normal QT/QTc        ED COURSE:  ED Course as of Aug 08 0719   Thu Aug 08, 2019   0530 Previous hospital records reviewed.    [DC]   0530 Patient started on DuoNeb and will receive additional dose of steroids.  We will check chest x-ray to see if there is any interval  change.    [DC]   0554 XR chest portable 1 view [DC]   0638 Patient sitting upright in no obvious physical or respiratory distress.  He remains in sinus rhythm on the monitor without ectopy or dysrhythmia.  No oxygen requirement.    [DC]   0719 Comprehensive metabolic panel Blood, Venous(!):    Sodium 139   Potassium 3.7   Chloride 108(!)   CO2 23   Anion Gap 8   BUN 19   Creatinine, Ser 0.77   Glucose 110(!)   Calcium 8.7   AST 17   ALT (SGPT) 10   Alkaline Phosphatase 40(!)   Total Protein 6.6   Albumin 3.5   Total Bilirubin 0.54   eGFR 91   Corrected Calcium 9.1 [DC]   0719 Alcohol Blood, Venous:    Ethanol Lvl <10 [DC]      ED Course User Index  [DC] Wallace Mays MD            MDM:  MDM  Number of Diagnoses or Management Options  COPD exacerbation (CMS/HCC) ([facility]):   Shortness of breath:   Diagnosis management comments: Patient presents with dyspnea.  He has a history of end-stage COPD.  He was just evaluated and hospitalized and discharged yesterday afternoon but did not  his bronchodilators or antibiotics.  After being given nebulized treatment by EMS I find him in no respiratory distress.  His oxygen saturations are acceptable at 93% on room air.  He does not have an oxygen requirement.  He does need bronchodilators, and steroids.  We will dispense him some from the emergency department.  I do not see any criteria for admission at this time as his chest x-ray is unchanged from previous without evidence of infiltrates, effusions or pneumothorax.  Doubt pneumonia.  Doubt acute coronary syndrome.       Amount and/or Complexity of Data Reviewed  Clinical lab tests: reviewed  Tests in the radiology section of CPT®: ordered and reviewed  Decide to obtain previous medical records or to obtain history from someone other than the patient: yes  Independent visualization of images, tracings, or specimens: yes        Final diagnoses:   [J44.1] COPD exacerbation (CMS/HCC) (HCC)   [R06.02] Shortness of breath         Wallace Mays MD  08/08/19 1920

## 2019-08-09 ENCOUNTER — HOSPITAL ENCOUNTER (EMERGENCY)
Facility: HOSPITAL | Age: 69
Discharge: HOME | End: 2019-08-09
Admitting: EMERGENCY MEDICINE
Payer: MEDICARE

## 2019-08-09 ENCOUNTER — APPOINTMENT (OUTPATIENT)
Dept: RADIOLOGY | Facility: HOSPITAL | Age: 69
End: 2019-08-09
Payer: MEDICARE

## 2019-08-09 ENCOUNTER — HOSPITAL ENCOUNTER (EMERGENCY)
Facility: HOSPITAL | Age: 69
Discharge: HOME | End: 2019-08-09
Attending: EMERGENCY MEDICINE
Payer: MEDICARE

## 2019-08-09 VITALS
RESPIRATION RATE: 16 BRPM | OXYGEN SATURATION: 93 % | HEART RATE: 68 BPM | DIASTOLIC BLOOD PRESSURE: 51 MMHG | SYSTOLIC BLOOD PRESSURE: 88 MMHG | TEMPERATURE: 97.8 F

## 2019-08-09 VITALS
HEART RATE: 63 BPM | DIASTOLIC BLOOD PRESSURE: 76 MMHG | OXYGEN SATURATION: 96 % | HEIGHT: 72 IN | SYSTOLIC BLOOD PRESSURE: 123 MMHG | TEMPERATURE: 98.1 F | RESPIRATION RATE: 16 BRPM | WEIGHT: 169 LBS | BODY MASS INDEX: 22.89 KG/M2

## 2019-08-09 DIAGNOSIS — R06.02 SHORTNESS OF BREATH: Primary | ICD-10-CM

## 2019-08-09 DIAGNOSIS — J44.1 COPD EXACERBATION (CMS/HCC): ICD-10-CM

## 2019-08-09 PROCEDURE — 94640 AIRWAY INHALATION TREATMENT: CPT

## 2019-08-09 PROCEDURE — 93005 ELECTROCARDIOGRAM TRACING: CPT

## 2019-08-09 PROCEDURE — 6370000100 HC RX 637 (ALT 250 FOR IP): Performed by: EMERGENCY MEDICINE

## 2019-08-09 PROCEDURE — 99283 EMERGENCY DEPT VISIT LOW MDM: CPT | Performed by: EMERGENCY MEDICINE

## 2019-08-09 PROCEDURE — 71046 X-RAY EXAM CHEST 2 VIEWS: CPT

## 2019-08-09 PROCEDURE — 99282 EMERGENCY DEPT VISIT SF MDM: CPT | Mod: 25 | Performed by: EMERGENCY MEDICINE

## 2019-08-09 PROCEDURE — 99284 EMERGENCY DEPT VISIT MOD MDM: CPT | Mod: 25

## 2019-08-09 PROCEDURE — 99284 EMERGENCY DEPT VISIT MOD MDM: CPT | Performed by: EMERGENCY MEDICINE

## 2019-08-09 PROCEDURE — 99283 EMERGENCY DEPT VISIT LOW MDM: CPT | Mod: 25

## 2019-08-09 RX ORDER — IPRATROPIUM BROMIDE AND ALBUTEROL SULFATE 2.5; .5 MG/3ML; MG/3ML
3 SOLUTION RESPIRATORY (INHALATION) ONCE
Status: COMPLETED | OUTPATIENT
Start: 2019-08-09 | End: 2019-08-09

## 2019-08-09 RX ADMIN — IPRATROPIUM BROMIDE AND ALBUTEROL SULFATE 3 ML: .5; 3 SOLUTION RESPIRATORY (INHALATION) at 04:19

## 2019-08-09 RX ADMIN — IPRATROPIUM BROMIDE AND ALBUTEROL SULFATE 3 ML: .5; 3 SOLUTION RESPIRATORY (INHALATION) at 21:52

## 2019-08-09 ASSESSMENT — ENCOUNTER SYMPTOMS
FEVER: 0
TROUBLE SWALLOWING: 0

## 2019-08-09 NOTE — DISCHARGE INSTRUCTIONS
Continue your albuterol rescue inhaler, steroids and antibiotics as dispensed and prescribed this morning.  We would strongly recommend you return to your home area where you have a nebulizer available.  If not you can rent a nebulizer from a local medical supply company.  If your breathing worsens you are welcome to return for reevaluation.

## 2019-08-09 NOTE — ED PROVIDER NOTES
HPI:  Chief Complaint   Patient presents with   • Shortness of Breath       Patient is he has history of COPD.  He is currently visiting the local area further [facility] from [place].  He states he did not bring his nebulizer with him because it is too bulky to travel with.  He was seen at [facility] several days ago and admitted overnight and discharged.  He was seen yesterday several hours after his hospital discharge after he did not fill his discharge prescriptions.  He was treated from this emergency department and released.  He seems to get better with one nebulized treatment.  Work-up was done including chest x-ray, labs, and there have been no significant interval changes.  He was given steroids and provided with medications including an inhaler and Zithromax.  Brought in by ambulance for evaluation of difficulty breathing.  He has had several emergency department visits and hospitalizations for dyspnea over the past several days.          HISTORY:  Past Medical History:   Diagnosis Date   • Guillen's esophagus    • Chronic low back pain    • COPD (chronic obstructive pulmonary disease) (CMS/HCC) (HCC)     On home oxygen.   • Ex-smoker for more than 1 year    • Hepatitis C     With elevated LFTs   • Leukopenia    • Liver cirrhosis (CMS/HCC) (HCC)    • On home oxygen therapy    • Peripheral vascular disease (CMS/HCC) (HCC)    • Thrombocytopenia (CMS/HCC) (HCC)        Past Surgical History:   Procedure Laterality Date   • BACK SURGERY      Lumbar spine surgery to remove bone chips.   • BACK SURGERY     • CATARACT EXTRACTION W/  INTRAOCULAR LENS IMPLANT Left    • INGUINAL HERNIA REPAIR         Family History   Problem Relation Age of Onset   • No Known Problems Mother    • No Known Problems Father        Social History     Tobacco Use   • Smoking status: Former Smoker   • Smokeless tobacco: Never Used   • Tobacco comment: Former heavy smoker   Substance Use Topics   • Alcohol use: No   •  Drug use: No         ROS:  Review of Systems   Constitutional: Negative for fever.   HENT: Negative for congestion, nosebleeds and trouble swallowing.        PE:  ED Triage Vitals   Temp Heart Rate Resp BP SpO2   08/09/19 0411 08/09/19 0411 08/09/19 0411 08/09/19 0411 08/09/19 0411   36.6 °C (97.8 °F) 67 20 131/83 97 %      Temp Source Heart Rate Source Patient Position BP Location FiO2 (%)   08/09/19 0411 -- 08/09/19 0530 -- --   Tympanic  Head of bed 30 degrees or higher         Physical Exam    ED LABS:  Labs Reviewed - No data to display      ED IMAGES:  No orders to display       ED PROCEDURES:  Procedures    ED COURSE:  ED Course as of Aug 09 0534   Fri Aug 09, 2019   0418 Patient received a nebulized breathing treatment in [facility].  Time of my assessment he is in absolutely no respiratory distress.  He is awake, alert and vital signs do not reflect serious distress.  His oxygen saturations are 95 to 97% on room air.  There is no retractions or accessory muscle breathing.  His breath sounds are clear.  It appears his problem is either anxiety or the fact that he has not been able to use his nebulized treatments because he left at home in [place] because it was too big to travel with.  He is already on steroids, antibiotics, and bronchodilators.  I have nothing further to offer him.  He will be discharged after another nebulized treatment in stable condition.  I have strongly suggested he return to his home area where he has nebulizer available or rent one while he is here visiting.  Patient states he would rather stay for the entire rally so that he can get T-shirts for his brother.    [DC]   0543 Patient has been resting comfortably after nebulized treatment.  He was whistling loudly for long periods of time a musical tune.  It appears his dyspnea has resolved.  He is discharged ambulatory in stable condition    [DC]      ED Course User Index  [DC] Wallace Mays MD            MDM:  MDM  Number of  Diagnoses or Management Options  COPD exacerbation (CMS/[facility]) ([facility]):   Shortness of breath:   Diagnosis management comments: Patient presented with complaint of dyspnea.  This resolved after nebulized treatment.  He has nebulizer prescribed for him but did not bring it to the local area for the rally.  He has rescue inhaler, antibiotics and steroids which were dispensed yesterday morning here.  We can offer him no further treatment other than PRN nebulized treatments.  He is in no respiratory distress at the present time.  His oxygen saturations have remained stable on room air.  He can be managed as an outpatient.    Risk of Complications, Morbidity, and/or Mortality  Presenting problems: moderate  Diagnostic procedures: moderate  Management options: moderate    Patient Progress  Patient progress: improved      Final diagnoses:   [R06.02] Shortness of breath   [J44.1] COPD exacerbation (CMS/[facility]) ([facility])        Wallace Mays MD  08/09/19 0535

## 2019-08-10 ENCOUNTER — HOSPITAL ENCOUNTER (EMERGENCY)
Facility: HOSPITAL | Age: 69
Discharge: HOME | End: 2019-08-10
Admitting: EMERGENCY MEDICINE
Payer: MEDICARE

## 2019-08-10 VITALS
DIASTOLIC BLOOD PRESSURE: 69 MMHG | RESPIRATION RATE: 20 BRPM | HEART RATE: 70 BPM | OXYGEN SATURATION: 99 % | TEMPERATURE: 97.9 F | SYSTOLIC BLOOD PRESSURE: 108 MMHG

## 2019-08-10 DIAGNOSIS — J44.1 COPD EXACERBATION (CMS/HCC): Primary | ICD-10-CM

## 2019-08-10 PROCEDURE — 99284 EMERGENCY DEPT VISIT MOD MDM: CPT

## 2019-08-10 PROCEDURE — 6370000100 HC RX 637 (ALT 250 FOR IP)

## 2019-08-10 PROCEDURE — 99283 EMERGENCY DEPT VISIT LOW MDM: CPT | Performed by: EMERGENCY MEDICINE

## 2019-08-10 PROCEDURE — 94640 AIRWAY INHALATION TREATMENT: CPT

## 2019-08-10 RX ORDER — IPRATROPIUM BROMIDE AND ALBUTEROL SULFATE 2.5; .5 MG/3ML; MG/3ML
SOLUTION RESPIRATORY (INHALATION)
Status: COMPLETED
Start: 2019-08-10 | End: 2019-08-10

## 2019-08-10 RX ORDER — IPRATROPIUM BROMIDE AND ALBUTEROL SULFATE 2.5; .5 MG/3ML; MG/3ML
6 SOLUTION RESPIRATORY (INHALATION) ONCE
Status: COMPLETED | OUTPATIENT
Start: 2019-08-10 | End: 2019-08-10

## 2019-08-10 RX ADMIN — IPRATROPIUM BROMIDE AND ALBUTEROL SULFATE 6 ML: .5; 3 SOLUTION RESPIRATORY (INHALATION) at 22:58

## 2019-08-10 RX ADMIN — IPRATROPIUM BROMIDE AND ALBUTEROL SULFATE 6 ML: 2.5; .5 SOLUTION RESPIRATORY (INHALATION) at 22:58

## 2019-08-10 ASSESSMENT — ENCOUNTER SYMPTOMS
DIFFICULTY URINATING: 0
TROUBLE SWALLOWING: 0
SORE THROAT: 0
NAUSEA: 0
VOMITING: 0
ABDOMINAL PAIN: 0
FEVER: 0
VOICE CHANGE: 0
SINUS PAIN: 0
DIZZINESS: 0
WHEEZING: 1
PALPITATIONS: 0
SHORTNESS OF BREATH: 1

## 2019-08-10 NOTE — DISCHARGE INSTRUCTIONS
Take the medications prescribed at your previous ER visits here or at [place].  Return if new or worsening symptoms or concerns.  Follow-up with your doctor for close reevaluation.

## 2019-08-10 NOTE — ED PROVIDER NOTES
Room: 24    HPI:  Chief Complaint   Patient presents with   • Shortness of Breath     pt arrives EMS from [facility], here for the rally, does not have his neb machine, was here for the same 3 days ago, pt was trying to get into the [facility] for the night and they are full, law enforcemnt called EMS for his SOB     HPI  Patient presents here with shortness of breath.  He has been in here as well as [facility] for shortness of breath several times over the last few days.  He is here visiting from [place] for the motorcycle rally.  He initially states that he could not bring his nebulizer machine as it was too big on the bus.  He was here and discharged with albuterol nebs and steroids and antibiotics.  He is been noncompliant with them.  I read his note from today earlier at [facility] where he was evaluated and felt to be safe for discharge.  He now has rescue inhalers and treatments and access to medications.  He apparently went to the homeless shelter here but they were full so he comes back here now stating he is short of breath again.  He does not appear in any distress and is not needing oxygen here.    HISTORY:  Past Medical History:   Diagnosis Date   • Guillen's esophagus    • Chronic low back pain    • COPD (chronic obstructive pulmonary disease) (CMS/HCC) (HCC)     On home oxygen.   • Ex-smoker for more than 1 year    • Hepatitis C     With elevated LFTs   • Leukopenia    • Liver cirrhosis (CMS/HCC) (HCC)    • On home oxygen therapy    • Peripheral vascular disease (CMS/HCC) (HCC)    • Thrombocytopenia (CMS/HCC) (HCC)        Past Surgical History:   Procedure Laterality Date   • BACK SURGERY      Lumbar spine surgery to remove bone chips.   • BACK SURGERY     • CATARACT EXTRACTION W/  INTRAOCULAR LENS IMPLANT Left    • INGUINAL HERNIA REPAIR         Family History   Problem Relation Age of Onset   • No Known Problems Mother    • No Known Problems Father        Social History     Tobacco Use   • Smoking  status: Former Smoker   • Smokeless tobacco: Never Used   • Tobacco comment: Former heavy smoker   Substance Use Topics   • Alcohol use: No   • Drug use: No       ROS:  Constitutional: Negative for fever.   HENT: Negative for sore throat.    Eyes: Negative for pain.   Respiratory: Positive for shortness of breath.    Cardiovascular: Negative for chest pain.   Gastrointestinal: Negative for abdominal pain.   Endocrine: Negative for polyuria.   Genitourinary: Negative for flank pain.   Musculoskeletal: Negative for back pain.   Neurological: Negative for headaches.   Hematological: Negative for bleeding.    PHYSICAL EXAM:  ED Triage Vitals   Temp Heart Rate Resp BP SpO2   08/09/19 2124 08/09/19 2124 08/09/19 2124 08/09/19 2124 08/09/19 2124   36.7 °C (98.1 °F) 61 20 107/67 94 %      Temp Source Heart Rate Source Patient Position BP Location FiO2 (%)   08/09/19 2124 -- 08/09/19 2230 -- --   Oral  Right lateral       Nursing note and vitals reviewed.  Constitutional: appears well-developed.   HENT: Moist oral mucosa  Head: Normocephalic and atraumatic.   Eyes: Pupils are equal, round, and reactive to light.   Neck: Supple, no lymphadenopathy  Cardiovascular: Regular rate and rhythm with no murmur, rub, or gallop.  Normal pulses.  Pulmonary/Chest: No respiratory distress.  Clear to auscultation bilaterally.  Abdominal: Soft and nontender.    Back: No CVA tenderness.  Musculoskeletal: No edema  Neurological: Alert.   Skin: Skin is warm and dry. No rash noted.   Psychiatric: Normal mood and affect.      Labs Reviewed - No data to display    X-ray chest 2 views   Final Result   IMPRESSION:   Normal exam.          ED Medication Administration from 08/09/2019 2115 to 08/09/2019 5064       Date/Time Order Dose Route Action Action by     08/09/2019 2152 ipratropium-albuterol (DUO-NEB) 0.5-2.5 mg/3 mL nebulizer solution 3 mL 3 mL nebulization Given VIKKI Cronin            PROCEDURES:  Procedures      ED COURSE:  ED Course as of Aug  09 8204   Fri Aug 09, 2019   2131 : Sinus rhythm at 63 with no ischemia or ectopy    [BE]      ED Course User Index  [BE] Laura Hernandez MD       MDM:     Patient presents again today with shortness of breath.  He does not appear in any distress and his lungs are clear and he does not need oxygen.  He has no wheezing.  His EKG here does not show abnormality.  Chest x-ray does not show pneumonia or pneumothorax.  I did give a neb treatment although I do not believe clinically he needs one.  As more information comes it appears that he tried to go to the homeless shelter but they had no room and then he said he was short of breath and came here.  I discussed with the patient when he will be going home to [place] and he states not until the rallies over.  At this time I believe most of his problems here are social.  I do not believe he needs hospitalization medically and he was discharged in stable condition.  Patient is somewhat refusing to want to leave stating he has nowhere to go.  At this time we will check with crisis care to see if they have availability otherwise he does not medically need hospitalization and he will be discharged.      CLINICAL IMPRESSION:  Final diagnoses:   [R06.02] Shortness of breath   COPD   liver disease with hepatitis  Chronic low back pain      A voice recognition program was used to aid in documentation of this record.  Sometimes words are not printed exactly as they were spoken.  While efforts were made to carefully edit and correct any inaccuracies, some areas may be present; please take these into context.  Please contact the provider if areas are identified.             Laura Hernandez MD  08/09/19 8463

## 2019-08-11 ENCOUNTER — HOSPITAL ENCOUNTER (EMERGENCY)
Facility: HOSPITAL | Age: 69
Discharge: ANOTHER HOSPITAL | End: 2019-08-11
Attending: EMERGENCY MEDICINE
Payer: MEDICARE

## 2019-08-11 VITALS
BODY MASS INDEX: 22.89 KG/M2 | SYSTOLIC BLOOD PRESSURE: 117 MMHG | WEIGHT: 169 LBS | HEIGHT: 72 IN | HEART RATE: 60 BPM | RESPIRATION RATE: 20 BRPM | TEMPERATURE: 97.9 F | DIASTOLIC BLOOD PRESSURE: 76 MMHG | OXYGEN SATURATION: 95 %

## 2019-08-11 DIAGNOSIS — Z59.00 HOMELESSNESS: ICD-10-CM

## 2019-08-11 DIAGNOSIS — J44.1 COPD EXACERBATION (CMS/HCC): Primary | ICD-10-CM

## 2019-08-11 PROCEDURE — 94640 AIRWAY INHALATION TREATMENT: CPT

## 2019-08-11 PROCEDURE — 99284 EMERGENCY DEPT VISIT MOD MDM: CPT | Performed by: EMERGENCY MEDICINE

## 2019-08-11 PROCEDURE — 6370000100 HC RX 637 (ALT 250 FOR IP): Performed by: EMERGENCY MEDICINE

## 2019-08-11 RX ORDER — IPRATROPIUM BROMIDE AND ALBUTEROL SULFATE 2.5; .5 MG/3ML; MG/3ML
3 SOLUTION RESPIRATORY (INHALATION) ONCE
Status: COMPLETED | OUTPATIENT
Start: 2019-08-11 | End: 2019-08-11

## 2019-08-11 RX ADMIN — IPRATROPIUM BROMIDE AND ALBUTEROL SULFATE 3 ML: .5; 3 SOLUTION RESPIRATORY (INHALATION) at 07:52

## 2019-08-11 RX ADMIN — IPRATROPIUM BROMIDE AND ALBUTEROL SULFATE 3 ML: .5; 3 SOLUTION RESPIRATORY (INHALATION) at 02:19

## 2019-08-11 SDOH — ECONOMIC STABILITY - HOUSING INSECURITY: HOMELESSNESS UNSPECIFIED: Z59.00

## 2019-08-11 NOTE — ED PROVIDER NOTES
HPI:  Chief Complaint   Patient presents with   • Shortness of Breath     chronic SOB and chronic COPD/        Gentleman presents via EMS for evaluation of difficulty breathing.  He has a history of COPD and has been seen here on several visits as well as in [place] at the hospital there several visits including last night where he was seen and discharged after chest x-ray was unchanged.  He reports leaving his home nebulizer in his home area of [place] prior to coming here to the [facility].  Last few nights, he has received nebulized treatments either in the ambulance or in the emergency department and is felt improved and is been discharged.  He was admitted overnight the first night several days ago at [facility] however was discharged the next day.  Within 8 hours he was back in the emergency department because he did not have a nebulizer nor had he filled the discharge prescriptions from [place].  At that time he was given additional treatments, was found in no respiratory distress and was discharged.  This time we dispensed medications to him including Zithromax, and prednisone which had been prescribed at [place].  He is otherwise been afebrile, no chills, no sweats, no vomiting or chest pain no leg swelling.          HISTORY:  Past Medical History:   Diagnosis Date   • Guillen's esophagus    • Chronic low back pain    • COPD (chronic obstructive pulmonary disease) (CMS/HCC) (HCC)     On home oxygen.   • Ex-smoker for more than 1 year    • Hepatitis C     With elevated LFTs   • Leukopenia    • Liver cirrhosis (CMS/HCC) (HCC)    • On home oxygen therapy    • Peripheral vascular disease (CMS/HCC) (HCC)    • Thrombocytopenia (CMS/HCC) (HCC)        Past Surgical History:   Procedure Laterality Date   • BACK SURGERY      Lumbar spine surgery to remove bone chips.   • BACK SURGERY     • CATARACT EXTRACTION W/  INTRAOCULAR LENS IMPLANT Left    • INGUINAL HERNIA REPAIR          Family History   Problem Relation Age of Onset   • No Known Problems Mother    • No Known Problems Father        Social History     Tobacco Use   • Smoking status: Former Smoker   • Smokeless tobacco: Never Used   • Tobacco comment: Former heavy smoker   Substance Use Topics   • Alcohol use: No   • Drug use: No         ROS:  Review of Systems   Constitutional: Negative for fever.   HENT: Negative for congestion, sinus pain, sore throat, trouble swallowing and voice change.    Respiratory: Positive for shortness of breath and wheezing.    Cardiovascular: Negative for chest pain, palpitations and leg swelling.   Gastrointestinal: Negative for abdominal pain, nausea and vomiting.   Genitourinary: Negative for difficulty urinating.   Skin: Negative for rash.   Neurological: Negative for dizziness.   Psychiatric/Behavioral: Negative for suicidal ideas.       PE:  ED Triage Vitals [08/10/19 2239]   Temp Heart Rate Resp BP SpO2   36.6 °C (97.9 °F) 70 20 108/69 99 %      Temp Source Heart Rate Source Patient Position BP Location FiO2 (%)   Tympanic -- -- -- --       Physical Exam   Constitutional: He is oriented to person, place, and time.  Non-toxic appearance. No distress.   Frail elderly male, in no physical or respiratory distress, frequent frequently whistling a tune   HENT:   Head: Normocephalic and atraumatic.   Mouth/Throat: Oropharynx is clear and moist.   Eyes: Pupils are equal, round, and reactive to light. EOM are normal.   Pulmonary/Chest: Effort normal and breath sounds normal. No accessory muscle usage or stridor. No tachypnea. No respiratory distress. He exhibits no tenderness, no edema and no swelling.   Abdominal: Soft. There is no tenderness.   Musculoskeletal: Normal range of motion.        Right lower leg: He exhibits no edema.        Left lower leg: He exhibits no edema.   Neurological: He is alert and oriented to person, place, and time.   Skin: Skin is warm and dry. No rash noted.    Psychiatric: He has a normal mood and affect. His behavior is normal.       ED LABS:  Labs Reviewed - No data to display      ED IMAGES:  No orders to display       ED PROCEDURES:  Procedures    ED COURSE:  ED Course as of Aug 10 2350   Sat Aug 10, 2019   2257 Patient is now well-known to me for multiple emergency department visits.  He presents via EMS for evaluation of difficulty breathing.  At the time of my initial assessment he is resting comfortably.  He is in no respiratory distress.  Breathing is unlabored.  He has no retractions or accessory muscle breathing.  Oxygen saturations are in the high 90s on room air.  He has been worked up extensively over the last week including several chest x-rays and multiple courses of laboratories.  No significant abnormalities have been discovered on these investigations.  Essentially, he is requesting nebulized treatments.  He left his home area in [place] and came out here to the [facility] and stated that his nebulizers for his COPD were too heavy to carry with him.  Thus, every night he is been here in the emergency department or in [place] for treatments.  He generally responds to these treatments most times he is been discharged.  This is what I intend to do tonight after giving him another nebulized treatments.    [DC]   2258 Patient also appears to have difficulty finding a place to stay and we will attempt to assist him in getting him where he needs to be.    [DC]   9687 A voice recognition program was used to aid in documentation of this record.  Sometimes words are not printed exactly as they were spoken.  While efforts were made to carefully edit and correct any inaccuracies, some errors may be present; please take these into context.  Please contact the provider if areas are identified.       [DC]      ED Course User Index  [DC] Wallace Mays MD            MDM:  MDM  Number of Diagnoses or Management Options  COPD exacerbation (CMS/HCC) (HCC):    Diagnosis management comments: Patient with known COPD who is noncompliant with his nebulized home treatments because he did not bring his nebulizer with him presents for several encounters for shortness of breath.  These respond to nebulized treatments.  He is in no respiratory distress either clinically or by vital signs.  He can continue his steroid and antibiotics and bronchodilators as an outpatient follow-up with his primary care provider.      Final diagnoses:   [J44.1] COPD exacerbation (CMS/[facility]) ([facility])        Wallace Mays MD  08/10/19 6048

## 2019-08-11 NOTE — DISCHARGE INSTRUCTIONS
You have been seen again for shortness of breath.  Your exam and vital signs do not reflect a serious COPD exacerbation but instead your dyspnea seems to be caused by the fact that you did not bring your nebulizer with you to the local area from home.  You have been given nebulized treatments.  Please continue the medications you were previously dispensed here several days ago including the steroids and metered-dose inhaler.  Should you have worsening breathing or any new concerns you are welcome to return to the emergency department.

## 2019-08-11 NOTE — ED PROVIDER NOTES
Chief Complaint   Patient presents with   • Shortness of Breath     pt arrives via EMS for shortness of breath. pt was found slumped over outside of detox. pt was recently given an inhaler from this facility, states it isnt helping.          HPI:    Patient is a 69 y.o. male presenting to the emergency department via EMS with a chief complaint of shortness of breath for the past few hours. This is his third visit here for this and he was discharged with an inhaler last night. He states this has not been helping. Nebulizer treatments have helped him during his past couple visits. Patient has a history of COPD. He also states he has nowhere to stay during the nights.  He is visiting here from [place] for the motorcycle [employer].  Appetite's been good.  No fevers chills cough or cold.  No chest pain.      Past Medical History:   Diagnosis Date   • Guillen's esophagus    • Chronic low back pain    • COPD (chronic obstructive pulmonary disease) (CMS/HCC) (HCC)     On home oxygen.   • Ex-smoker for more than 1 year    • Hepatitis C     With elevated LFTs   • Leukopenia    • Liver cirrhosis (CMS/HCC) (HCC)    • On home oxygen therapy    • Peripheral vascular disease (CMS/HCC) (HCC)    • Thrombocytopenia (CMS/HCC) (HCC)        Past Surgical History:   Procedure Laterality Date   • BACK SURGERY      Lumbar spine surgery to remove bone chips.   • BACK SURGERY     • CATARACT EXTRACTION W/  INTRAOCULAR LENS IMPLANT Left    • INGUINAL HERNIA REPAIR         Social History     Socioeconomic History   • Marital status: Single     Spouse name: Not on file   • Number of children: Not on file   • Years of education: Not on file   • Highest education level: Not on file   Occupational History   • Not on file   Social Needs   • Financial resource strain: Not on file   • Food insecurity:     Worry: Not on file     Inability: Not on file   • Transportation needs:     Medical: Not on file     Non-medical: Not on file   Tobacco Use   •  "Smoking status: Former Smoker   • Smokeless tobacco: Never Used   • Tobacco comment: Former heavy smoker   Substance and Sexual Activity   • Alcohol use: No   • Drug use: No   • Sexual activity: Defer   Lifestyle   • Physical activity:     Days per week: Not on file     Minutes per session: Not on file   • Stress: Not on file   Relationships   • Social connections:     Talks on phone: Not on file     Gets together: Not on file     Attends Bahai service: Not on file     Active member of club or organization: Not on file     Attends meetings of clubs or organizations: Not on file     Relationship status: Not on file   • Intimate partner violence:     Fear of current or ex partner: Not on file     Emotionally abused: Not on file     Physically abused: Not on file     Forced sexual activity: Not on file   Other Topics Concern   • Not on file   Social History Narrative   • Not on file       Family History   Problem Relation Age of Onset   • No Known Problems Mother    • No Known Problems Father        Allergies   Allergen Reactions   • Chicken Derived Swelling     Also allergy to other bird meat.   • Influenza Virus Vaccines Other (see comments)     Patient lost consciousness at home after receiving the immunization. He was hospitalized overnight.   • Venom-Honey Bee Anaphylaxis   • Acetaminophen Other (see comments)     PN: LW Reaction: \"makes me sick\"  Liver function.     • Aspirin, Buffered Other (see comments)     Skin flaking/ulcer   • Gluten Nausea And Vomiting     Other reaction(s): Gastrointestinal   • Other      PN: LW Other1: -turkey, chicken, gluten, ryes   • Tolmetin GI intolerance         Current Outpatient Medications:   •  fluticasone HFA (FLOVENT HFA) 220 mcg/actuation inhaler, Inhale 2 puffs 2 times daily, Disp: , Rfl:   •  fluticasone propion-salmeterol (ADVAIR HFA) 115-21 mcg/actuation inhaler, Inhale 2 puffs daily, Disp: , Rfl:   •  beclomethasone (QVAR) 40 mcg/actuation inhaler, Inhale 2 puffs 2 " times daily, Disp: , Rfl:   •  ergocalciferol (VITAMIN D2) 50,000 unit capsule, Take 50,000 Units by mouth once a week, Disp: , Rfl:   •  tamsulosin (FLOMAX) 0.4 mg capsule, Take 0.4 mg by mouth daily, Disp: , Rfl:   •  finasteride (PROSCAR) 5 mg tablet, Take 5 mg by mouth daily, Disp: , Rfl:   •  alendronate (FOSAMAX) 70 mg tablet, Take 70 mg by mouth once a week, Disp: , Rfl:   •  fluocinonide (LIDEX) 0.05 % ointment, Apply 1 application topically daily, Disp: , Rfl:   •  diclofenac sodium (VOLTAREN) 1 % gel, Place 4 g on the skin 4 (four) times a day And 2 g to left shoulder, Disp: , Rfl:   •  polyethylene glycol (GLYCOLAX) 17 gram/dose powder, Take 17 g by mouth daily, Disp: , Rfl:   •  omeprazole (PriLOSEC) 40 mg capsule, Take 40 mg by mouth 2 (two) times a day, Disp: , Rfl:   •  calcium carbonate (OS-JACY) 500 mg calcium (1,250 mg) chewable tablet, Take 500 mg by mouth 2 (two) times a day, Disp: , Rfl:   •  guaiFENesin 1,200 mg tablet extended release 12hr, Take 1,200 mg by mouth 2 (two) times a day, Disp: , Rfl:   •  econazole nitrate (ECOZA) 1 % cream, Apply 1 application topically 2 (two) times a day Indications: to toe nails  , Disp: , Rfl:   •  oxycodone HCl (OXYCODONE ORAL), Take by mouth, Disp: , Rfl:   •  predniSONE (DELTASONE) 20 mg tablet, Take 2 tablets (40 mg total) by mouth daily for 3 doses, Disp: 6 tablet, Rfl: 0  •  ipratropium-albuterol (DUO-NEB) 0.5-2.5 mg/3 mL nebulizer solution, Take 3 mL by nebulization every 6 (six) hours., Disp: 300 mL, Rfl: 0  •  acetaminophen (TYLENOL) 500 mg tablet, Take 500 mg by mouth., Disp: , Rfl:   •  albuterol HFA (PROVENTIL HFA;VENTOLIN HFA) 90 mcg/actuation inhaler, Inhale 2 puffs every 4 (four) hours as needed  , Disp: , Rfl:   •  budesonide (PULMICORT) 0.5 mg/2 mL nebulizer solution, Nebulize the contents of 1 vial twice daily - this is to replace your Advair Diskus and Advair HFA inhaler., Disp: , Rfl:   •  tiotropium (SPIRIVA) 1 capsule = per inhalation  capsule, Place 1 capsule into inhaler and inhale daily  , Disp: , Rfl:       ROS:  Constitutional: negative for fever  Eyes: negative for eye pain  ENT: negative for sore throat  Cardiovascular: negative for chest pain  Respiratory: negative for cough, positive for shortness of breath.   GI: negative for abdominal pain  : negative for urinary frequency  Musculoskeletal: negative for back pain  Neuro: negative for headache  Hematology: negative for bleeding  Immunology: negative for joint pain      ED Triage Vitals   Temp Heart Rate Resp BP SpO2   08/11/19 0137 08/11/19 0137 08/11/19 0137 08/11/19 0137 08/11/19 0137   36.6 °C (97.9 °F) 70 18 112/69 96 %      Temp Source Heart Rate Source Patient Position BP Location FiO2 (%)   08/11/19 0137 -- 08/11/19 0619 -- 08/11/19 0221   Oral  Head of bed 30 degrees or higher  21 %         Physical Exam:  Nursing note and vitals reviewed.  Constitutional: Cachectic appearing.  HENT: Face is symmetrical.   Head: Normocephalic and atraumatic.   Eyes: Pupils are equal, round, and reactive to light.   Neck: Supple, no lymphadenopathy  Cardiovascular: Regular rate and rhythm with no murmur, rub, or gallop.  Normal pulses.  Pulmonary/Chest: No respiratory distress. Decreased breath sounds bilaterally.  Abdominal: Soft and nontender.    Back: No CVA tenderness.  Musculoskeletal: No edema.   Neurological: Alert and oriented.   Skin: Skin is warm and dry. No rash noted.   Psychiatric: Normal mood and affect.         Labs:  Labs Reviewed - No data to display      Imaging:  No orders to display         Given   Medications   ipratropium-albuterol (DUO-NEB) 0.5-2.5 mg/3 mL nebulizer solution 3 mL (has no administration in time range)   ipratropium-albuterol (DUO-NEB) 0.5-2.5 mg/3 mL nebulizer solution 3 mL (3 mL nebulization Given 8/11/19 0219)         Procedures               MDM:    Patient presents with shortness of breath. Old records reviewed. He has had 3 normal chest x-rays in the  past 5 days as well as numerous laboratory studies. Nebulizer treatments will be given and arrangements will be made about where to send him as he has no place to stay.    02:33 - After nebulizers patient's symptoms have improved. He will be [facility] to [facility] where he was [facility] night. He is agreeable to this. Return instructions were given.  Crisis center would not take him back so we let him stay in ED then had him transported to bus station where he wanted to go to catch bus back to [place] today. No further evaluation needed tonight.  He wanted somewhere to stay tonight.     Clinical Impression:    Final diagnoses:   [J44.1] COPD exacerbation (CMS/HCC) ([facility])   [Z59.0] Homelessness       By signing my name, I, Hany Fabio, attest that this documentation has been prepared under the direction and in the presence of Dr. Tilley, 8/11/2019, 1:57 AM.      A voice recognition program was used to aid in documentation of this record.  Sometimes words are not printed exactly as they were spoken.  While efforts were made to carefully edit and correct any inaccuracies, some areas may be present; please take these into context.  Please contact the provider if areas are identified.      I, Dr. Tilley, personally performed the services described in this documentation as scribed by the medical scribe in my presence, and it is both accurate and complete.         José Tilley MD  08/11/19 2516

## 2022-01-01 ENCOUNTER — TELEPHONE (OUTPATIENT)
Dept: GERIATRICS | Facility: CLINIC | Age: 72
End: 2022-01-01

## 2022-01-01 ENCOUNTER — ASSISTED LIVING VISIT (OUTPATIENT)
Dept: GERIATRICS | Facility: CLINIC | Age: 72
End: 2022-01-01
Payer: COMMERCIAL

## 2022-01-01 ENCOUNTER — LAB REQUISITION (OUTPATIENT)
Dept: LAB | Facility: CLINIC | Age: 72
End: 2022-01-01
Payer: COMMERCIAL

## 2022-01-01 ENCOUNTER — DOCUMENTATION ONLY (OUTPATIENT)
Dept: OTHER | Facility: CLINIC | Age: 72
End: 2022-01-01
Payer: COMMERCIAL

## 2022-01-01 VITALS
RESPIRATION RATE: 18 BRPM | TEMPERATURE: 98 F | WEIGHT: 157.8 LBS | BODY MASS INDEX: 22.09 KG/M2 | SYSTOLIC BLOOD PRESSURE: 100 MMHG | DIASTOLIC BLOOD PRESSURE: 78 MMHG | HEART RATE: 75 BPM

## 2022-01-01 VITALS
HEIGHT: 70 IN | DIASTOLIC BLOOD PRESSURE: 90 MMHG | SYSTOLIC BLOOD PRESSURE: 110 MMHG | BODY MASS INDEX: 22.9 KG/M2 | WEIGHT: 160 LBS | TEMPERATURE: 96.4 F

## 2022-01-01 VITALS
WEIGHT: 160 LBS | RESPIRATION RATE: 18 BRPM | SYSTOLIC BLOOD PRESSURE: 100 MMHG | HEIGHT: 70 IN | HEART RATE: 75 BPM | TEMPERATURE: 98 F | BODY MASS INDEX: 22.9 KG/M2 | DIASTOLIC BLOOD PRESSURE: 78 MMHG

## 2022-01-01 VITALS
SYSTOLIC BLOOD PRESSURE: 165 MMHG | TEMPERATURE: 97.3 F | HEART RATE: 86 BPM | RESPIRATION RATE: 20 BRPM | DIASTOLIC BLOOD PRESSURE: 97 MMHG | HEIGHT: 70 IN | OXYGEN SATURATION: 91 % | BODY MASS INDEX: 22.9 KG/M2 | WEIGHT: 160 LBS

## 2022-01-01 DIAGNOSIS — M54.50 CHRONIC LOW BACK PAIN, UNSPECIFIED BACK PAIN LATERALITY, UNSPECIFIED WHETHER SCIATICA PRESENT: ICD-10-CM

## 2022-01-01 DIAGNOSIS — J44.9 CHRONIC OBSTRUCTIVE PULMONARY DISEASE, UNSPECIFIED COPD TYPE (H): ICD-10-CM

## 2022-01-01 DIAGNOSIS — R09.02 HYPOXIA: ICD-10-CM

## 2022-01-01 DIAGNOSIS — D59.11 WARM ANTIBODY HEMOLYTIC ANEMIA (H): Primary | ICD-10-CM

## 2022-01-01 DIAGNOSIS — T14.8XXA OPEN WOUND: Primary | ICD-10-CM

## 2022-01-01 DIAGNOSIS — I89.0 LYMPHEDEMA, NOT ELSEWHERE CLASSIFIED: ICD-10-CM

## 2022-01-01 DIAGNOSIS — Z53.9 ERRONEOUS ENCOUNTER--DISREGARD: Primary | ICD-10-CM

## 2022-01-01 DIAGNOSIS — N40.0 BENIGN PROSTATIC HYPERPLASIA, UNSPECIFIED WHETHER LOWER URINARY TRACT SYMPTOMS PRESENT: ICD-10-CM

## 2022-01-01 DIAGNOSIS — G89.29 CHRONIC LOW BACK PAIN, UNSPECIFIED BACK PAIN LATERALITY, UNSPECIFIED WHETHER SCIATICA PRESENT: ICD-10-CM

## 2022-01-01 DIAGNOSIS — R41.89 COGNITIVE IMPAIRMENT: ICD-10-CM

## 2022-01-01 DIAGNOSIS — R82.90 UNSPECIFIED ABNORMAL FINDINGS IN URINE: ICD-10-CM

## 2022-01-01 DIAGNOSIS — I73.9 PERIPHERAL VASCULAR DISEASE (H): ICD-10-CM

## 2022-01-01 DIAGNOSIS — Z85.51 PERSONAL HISTORY OF MALIGNANT NEOPLASM OF BLADDER: ICD-10-CM

## 2022-01-01 DIAGNOSIS — K74.60 HEPATIC CIRRHOSIS, UNSPECIFIED HEPATIC CIRRHOSIS TYPE, UNSPECIFIED WHETHER ASCITES PRESENT (H): ICD-10-CM

## 2022-01-01 DIAGNOSIS — Z87.19 HISTORY OF BARRETT'S ESOPHAGUS: ICD-10-CM

## 2022-01-01 DIAGNOSIS — U07.1 COVID-19: Primary | ICD-10-CM

## 2022-01-01 DIAGNOSIS — Z87.81 HISTORY OF COMPRESSION FRACTURE OF SPINE: ICD-10-CM

## 2022-01-01 LAB
ALBUMIN SERPL-MCNC: 3.6 G/DL (ref 3.5–5)
ALP SERPL-CCNC: 92 U/L (ref 45–120)
ALT SERPL W P-5'-P-CCNC: 60 U/L (ref 0–45)
ALT SERPL W P-5'-P-CCNC: 60 U/L (ref 0–45)
ANION GAP SERPL CALCULATED.3IONS-SCNC: 12 MMOL/L (ref 5–18)
AST SERPL W P-5'-P-CCNC: 30 U/L (ref 0–40)
AST SERPL W P-5'-P-CCNC: 30 U/L (ref 0–40)
BILIRUB DIRECT SERPL-MCNC: 0.5 MG/DL
BILIRUB SERPL-MCNC: 1 MG/DL (ref 0–1)
BILIRUB SERPL-MCNC: 1 MG/DL (ref 0–1)
BUN SERPL-MCNC: 20 MG/DL (ref 8–28)
CALCIUM SERPL-MCNC: 10 MG/DL (ref 8.5–10.5)
CHLORIDE BLD-SCNC: 96 MMOL/L (ref 98–107)
CO2 SERPL-SCNC: 30 MMOL/L (ref 22–31)
CREAT SERPL-MCNC: 0.77 MG/DL (ref 0.7–1.3)
ERYTHROCYTE [DISTWIDTH] IN BLOOD BY AUTOMATED COUNT: 13.2 % (ref 10–15)
GFR SERPL CREATININE-BSD FRML MDRD: >90 ML/MIN/1.73M2
GLUCOSE BLD-MCNC: 101 MG/DL (ref 70–125)
HCT VFR BLD AUTO: 39 % (ref 40–53)
HGB BLD-MCNC: 12.7 G/DL (ref 13.3–17.7)
MCH RBC QN AUTO: 31 PG (ref 26.5–33)
MCHC RBC AUTO-ENTMCNC: 32.6 G/DL (ref 31.5–36.5)
MCV RBC AUTO: 95 FL (ref 78–100)
PLATELET # BLD AUTO: 168 10E3/UL (ref 150–450)
POTASSIUM BLD-SCNC: 4 MMOL/L (ref 3.5–5)
PROT SERPL-MCNC: 8 G/DL (ref 6–8)
RBC # BLD AUTO: 4.1 10E6/UL (ref 4.4–5.9)
SODIUM SERPL-SCNC: 138 MMOL/L (ref 136–145)
WBC # BLD AUTO: 6.3 10E3/UL (ref 4–11)

## 2022-01-01 PROCEDURE — P9603 ONE-WAY ALLOW PRORATED MILES: HCPCS | Mod: ORL | Performed by: PHYSICIAN ASSISTANT

## 2022-01-01 PROCEDURE — 36415 COLL VENOUS BLD VENIPUNCTURE: CPT | Mod: ORL

## 2022-01-01 PROCEDURE — 80048 BASIC METABOLIC PNL TOTAL CA: CPT | Mod: ORL

## 2022-01-01 PROCEDURE — P9603 ONE-WAY ALLOW PRORATED MILES: HCPCS | Mod: ORL

## 2022-01-01 PROCEDURE — 85027 COMPLETE CBC AUTOMATED: CPT | Mod: ORL

## 2022-01-01 PROCEDURE — 80076 HEPATIC FUNCTION PANEL: CPT | Mod: ORL | Performed by: PHYSICIAN ASSISTANT

## 2022-01-01 PROCEDURE — 36415 COLL VENOUS BLD VENIPUNCTURE: CPT | Mod: ORL | Performed by: PHYSICIAN ASSISTANT

## 2022-01-01 RX ORDER — FINASTERIDE 5 MG/1
5 TABLET, FILM COATED ORAL DAILY
COMMUNITY

## 2022-01-01 RX ORDER — FUROSEMIDE 20 MG
20 TABLET ORAL DAILY
COMMUNITY

## 2022-01-01 RX ORDER — POLYETHYLENE GLYCOL 3350 17 G/17G
17 POWDER, FOR SOLUTION ORAL DAILY PRN
COMMUNITY

## 2022-01-01 RX ORDER — INHALER, ASSIST DEVICES
SPACER (EA) MISCELLANEOUS 2 TIMES DAILY
COMMUNITY

## 2022-01-01 RX ORDER — NYSTATIN 100000 [USP'U]/G
POWDER TOPICAL 2 TIMES DAILY
COMMUNITY

## 2022-01-01 RX ORDER — IPRATROPIUM BROMIDE AND ALBUTEROL SULFATE 2.5; .5 MG/3ML; MG/3ML
1 SOLUTION RESPIRATORY (INHALATION) 4 TIMES DAILY
COMMUNITY

## 2022-01-01 RX ORDER — SENNOSIDES A AND B 8.6 MG/1
1 TABLET, FILM COATED ORAL 2 TIMES DAILY PRN
COMMUNITY
End: 2022-01-01

## 2022-01-01 RX ORDER — ACETAMINOPHEN 325 MG/1
650 TABLET ORAL 3 TIMES DAILY
COMMUNITY

## 2022-01-01 RX ORDER — CALCIUM CARBONATE 500 MG/1
1 TABLET, CHEWABLE ORAL 2 TIMES DAILY
COMMUNITY

## 2022-01-01 RX ORDER — ZINC OXIDE
OINTMENT (GRAM) TOPICAL 2 TIMES DAILY
COMMUNITY

## 2022-01-01 RX ORDER — OMEPRAZOLE 40 MG/1
40 CAPSULE, DELAYED RELEASE ORAL 2 TIMES DAILY
COMMUNITY

## 2022-01-01 RX ORDER — ZINC OXIDE 20 %
OINTMENT (GRAM) TOPICAL PRN
COMMUNITY
End: 2022-01-01

## 2022-01-01 RX ORDER — AMOXICILLIN 250 MG
1 CAPSULE ORAL 2 TIMES DAILY PRN
COMMUNITY

## 2022-01-01 RX ORDER — LANOLIN ALCOHOL/MO/W.PET/CERES
CREAM (GRAM) TOPICAL DAILY
COMMUNITY

## 2022-01-01 RX ORDER — TAMSULOSIN HYDROCHLORIDE 0.4 MG/1
0.4 CAPSULE ORAL DAILY
COMMUNITY

## 2022-01-01 RX ORDER — BECLOMETHASONE DIPROPIONATE HFA 80 UG/1
1 AEROSOL, METERED RESPIRATORY (INHALATION) 2 TIMES DAILY
COMMUNITY

## 2022-10-11 NOTE — LETTER
10/11/2022        RE: Ciaran Dong  [address]  [place] [address]        [facility]  [facility] PROVIDER AND CLINIC:  Katerin Cavazos APRN CNP, [address] / [place] [address]  Chief Complaint   Patient presents with     [facility] Medical Record Number:  [id number]  Place of Service where encounter took place:  [facility] [[id number]]    Ciaran Dong  is a 72 year old  (1950), and is choosing to change PCPs to [facility].       HPI:  This is a 72-year-old male, with a past medical history significant for COPD, celiac disease, cirrhosis, chronic Hepatitis C, Jarquin's esophagus, and cognitive impairment, who has elected to change providers to [facility].     Per staff report, patient talks in brief sentences. Requires total care. Is brought down for meals. Is usually in a good mood. Uses a walker for mobility.    Today, after showing patient writer's name badge, patient looks at name badge and won't let go during visit. Does not talk even while  is present.     CODE STATUS/ADVANCE DIRECTIVES DISCUSSION:  No CPR- Do NOT Intubate    ALLERGIES:   Allergies   Allergen Reactions     Bee Venom Anaphylaxis     Chicken Allergy Anaphylaxis     Influenza Vaccines Other (See Comments)     Patient lost consciousness at home after receiving the immunization. He was hospitalized overnight.     Acetaminophen Other (See Comments)     Liver function.     Gluten Nausea and Vomiting     Nsaids      Seasonal Allergies      Shellfish Allergy      Difficulty swallowing     Tolmetin Nausea and Vomiting     Other reaction(s): GI intolerance     Triple Buffered Aspirin [Aspirin Buffered] GI Disturbance      PAST MEDICAL HISTORY:   Past Medical History:   Diagnosis Date     Jarquin's esophagus     associated hiatal hernia     Colon polyps     negative colonoscopy 7/2011 rec F/U in 10 years     Compression fracture      Stable compression fractures L3 and T12     COPD (chronic obstructive pulmonary disease) (H)      Diverticulosis     sigmoid colon     Gastro-oesophageal reflux disease     Barrets     Hepatitis C, chronic (H)     Genotype 3A     Hiatal hernia     Jarquin's esophagus, Upper endoscopy 10/07 Intestinal metaplasiia without dysplasia     Osteoarthritis of lumbar spine     Chronic low back pain, mechanical     Osteoarthritis, hip, bilateral      Osteoporosis      Osteoporosis     lumbar spine on dexa     Portal hypertensive gastropathy (H)     EGD 10/31/2013     Sensorineural hearing loss       PAST SURGICAL HISTORY:   has a past surgical history that includes inguinal hernia repairs; back surgery to remove bone chip (2005); Esophagoscopy, gastroscopy, duodenoscopy (EGD), combined (10/31/2013); hernia repair; colonoscopy; Phacoemulsification clear cornea with standard intraocular lens implant (Left, 4/3/2015); cataract iol, rt/lt (Left, 04/03/2015); and Phacoemulsification clear cornea with standard intraocular lens implant (Right, 4/24/2015).  FAMILY HISTORY: family history includes Family History Negative in an other family member.  SOCIAL HISTORY:   reports that he quit smoking about 7 years ago. His smoking use included cigarettes. He has quit using smokeless tobacco. He reports that he does not drink alcohol and does not use drugs.  Patient's living condition: lives in an assisted living facility    Current Outpatient Medications   Medication Sig     acetaminophen (TYLENOL) 325 MG tablet Take 2 tablets (650 mg) by mouth 3 times daily     beclomethasone HFA (QVAR REDIHALER) 80 MCG/ACT inhaler Inhale 1 puff into the lungs 2 times daily     calcium carbonate (TUMS) 500 MG chewable tablet Take 1 tablet (500 mg) by mouth 2 times daily     diclofenac (VOLTAREN) 1 % GEL Apply 4 gm to affected area four times daily     finasteride (PROSCAR) 5 MG tablet Take 1 tablet (5 mg) by mouth daily     ipratropium - albuterol 0.5  mg/2.5 mg/3 mL (DUONEB) 0.5-2.5 (3) MG/3ML neb solution Take 1 vial (3 mLs) by nebulization 4 times daily     ipratropium - albuterol 0.5 mg/2.5 mg/3 mL (DUONEB) 0.5-2.5 (3) MG/3ML nebulization Take 1 vial (3 mLs) by nebulization every 4 hours as needed     mineral oil-white petrolatum (EUCERIN) CREA cream Apply topically daily     Multiple Vitamin (TAB-A-AMERICA PO) Take 1 tablet by mouth daily     nystatin (MYCOSTATIN) 789379 UNIT/GM external powder Apply topically 2 times daily     omeprazole (PRILOSEC) 40 MG DR capsule Take 1 capsule (40 mg) by mouth 2 times daily     ORDER FOR DME Equipment being ordered: 4-wheeled rolling walker     polyethylene glycol (MIRALAX) 17 g packet Take 17 g by mouth daily as needed for constipation     senna (SENEXON) 8.6 MG tablet Take 1 tablet by mouth 2 times daily as needed for constipation     Spacer/Aero-Holding Chambers (MICROCHAMBER) MISC 2 times daily     tamsulosin (FLOMAX) 0.4 MG capsule Take 1 capsule (0.4 mg) by mouth daily     VITAMIN D, CHOLECALCIFEROL, PO Take by mouth daily     zinc oxide (DESITIN) 20 % external ointment Apply topically as needed for dry skin or irritation     zinc oxide (DESITIN) 40 % external ointment Apply topically 4 times daily     albuterol (2.5 MG/3ML) 0.083% nebulizer solution Take 1 vial (2.5 mg) by nebulization every 6 hours as needed for shortness of breath / dyspnea or wheezing     albuterol (PROAIR HFA, PROVENTIL HFA, VENTOLIN HFA) 108 (90 BASE) MCG/ACT inhaler Inhale 2 puffs into the lungs every 6 hours as needed for shortness of breath / dyspnea     albuterol (PROVENTIL) (5 MG/ML) 0.5% nebulizer solution Take 0.5 mLs (2.5 mg) by nebulization every 6 hours as needed for wheezing or shortness of breath / dyspnea     alendronate (FOSAMAX) 70 MG tablet Take 1 tablet (70 mg) by mouth every 7 days Take with a full glass of water and do not eat or lay down for 30 minutes     ammonium lactate (AMLACTIN) 12 % cream Apply topically daily Daily  after bath     beclomethasone (QVAR) 40 MCG/ACT Inhaler Inhale 2 puffs into the lungs 2 times daily     calcium carb 1250 mg, 500 mg [place],/vitamin D 200 units (OSCAL WITH D) 500-200 MG-UNIT per tablet Take 1 tablet by mouth 2 times daily (with meals)     calcium carbonate (TUMS) 500 MG chewable tablet Take 1 tablet (500 mg) by mouth daily     econazole nitrate 1 % cream To feet and toenails twice a day.     fluocinonide (LIDEX) 0.05 % ointment Apply topically daily     fluticasone (FLOVENT HFA) 220 MCG/ACT inhaler Inhale 2 puffs into the lungs 2 times daily     fluticasone-salmeterol (ADVAIR-HFA) 115-21 MCG/ACT inhaler Inhale 2 puffs into the lungs daily     hydrocortisone 1 % cream Apply topically 2 times daily     Hypromellose (ARTIFICIAL TEARS OP) Apply to eye as needed     Multiple Vitamin (THERAGRAN OR) Take by mouth daily Theragran -M     omeprazole 20 MG tablet Take 1 tablet (20 mg) by mouth daily Take 30-60 minutes before a meal. (Patient taking differently: Take 2 tablets (40 mg) by mouth 2 times daily Take 30-60 minutes before a meal.)     prednisoLONE acetate (PRED FORTE) 1 % ophthalmic suspension 1 drop in surgical eye as directed, 4x daily after surgery for 1 week, 3x daily for 1 week, 2x daily for 1 week, daily for 1 week, then stop     Skin Protectants, Misc. (EUCERIN) cream Apply topically as needed for dry skin     triamcinolone (KENALOG) 0.1 % ointment Apply topically 2 times daily To legs, abdomen, arms, and back     No current facility-administered medications for this visit.     ROS:  4 point ROS including Respiratory, CV, GI and , other than that noted in the HPI,  is negative    Vitals:  /78   Pulse 75   Temp 98  F (36.7  C)   Resp 18   Wt 71.6 kg (157 lb 12.8 oz)   BMI 22.09 kg/m    Exam:  GENERAL APPEARANCE:  Alert, in no distress  ENT:  Mouth and posterior oropharynx normal, moist mucous membranes  EYES:  EOM, conjunctivae, lids, pupils and irises normal  RESP:  respiratory  effort and palpation of chest normal, lungs clear to auscultation , no respiratory distress, in anterior lobes  CV:  Palpation and auscultation of heart done , regular rate and rhythm, no murmur, rub, or gallop  ABDOMEN:  normal bowel sounds, soft, nontender, no hepatosplenomegaly or other masses  M/S:   No edema in BLE  SKIN:  Inspection of skin and subcutaneous tissue baseline, Palpation of skin and subcutaneous tissue baseline  NEURO:   Cranial nerves 2-12 are normal tested and grossly at patient's baseline  PSYCH:  Unable to assess as patient is nonverbal during visit    Lab/Diagnostic data:  Labs done in SNF are in [facility]. Please refer to them using [employer]Care Everywhere.    ASSESSMENT/PLAN:  Cognitive Impairment. Does not speak during today's visit. No concerns per staff report. Requires total cares. Staff to assist with ADLs, medications, and meals.    Chronic Obstructive Pulmonary Disease. Continue DuoNeb and Beclomethasone as ordered.    Chronic Hepatitis C and Cirrhosis. Will need to clarify if patient follows with Gastroenterology. Takes Furosemide.    History of Jarquin's Esophagus. Continue Omeprazole as ordered.    Constipation. Continue Senna-S and Miralax as ordered.    Chronic Low Back Pain with History of L4 Compression Fracture. Continue Acetaminophen and Diclofenac as ordered.    Benign Prostatic Hyperplasia and History of Bladder Cancer. Previously followed by [facility]. Continue Tamsulosin and Finasteride as ordered.    Warm Antibody Hemolytic Anemia. Baseline Hemoglobin ~ 13. Monitor periodically.     Orders:  None    Electronically signed by:  LAMBERT Hernandez CNP                   Sincerely,        LAMBERT Hernandez CNP

## 2022-10-11 NOTE — PROGRESS NOTES
[facility]  [facility]:  Katerin Cavazos, APRN CNP, [address] / [place] [address]  Chief Complaint   Patient presents with     [facility] Medical Record Number:  [id number]  Place of Service where encounter took place:  [facility] [[id number]]    Ciaran Dong  is a 72 year old  (1950), and is choosing to change PCPs to [facility].       HPI:  This is a 72-year-old male, with a past medical history significant for COPD, celiac disease, cirrhosis, chronic Hepatitis C, Jarquin's esophagus, and cognitive impairment, who has elected to change providers to [facility].     Per staff report, patient talks in brief sentences. Requires total care. Is brought down for meals. Is usually in a good mood. Uses a walker for mobility.    Today, after showing patient writer's name badge, patient looks at name badge and won't let go during visit. Does not talk even while  is present.     CODE STATUS/ADVANCE DIRECTIVES DISCUSSION:  No CPR- Do NOT Intubate    ALLERGIES:   Allergies   Allergen Reactions     Bee Venom Anaphylaxis     Chicken Allergy Anaphylaxis     Influenza Vaccines Other (See Comments)     Patient lost consciousness at home after receiving the immunization. He was hospitalized overnight.     Acetaminophen Other (See Comments)     Liver function.     Gluten Nausea and Vomiting     Nsaids      Seasonal Allergies      Shellfish Allergy      Difficulty swallowing     Tolmetin Nausea and Vomiting     Other reaction(s): GI intolerance     Triple Buffered Aspirin [Aspirin Buffered] GI Disturbance      PAST MEDICAL HISTORY:   Past Medical History:   Diagnosis Date     Jarquin's esophagus     associated hiatal hernia     Colon polyps     negative colonoscopy 7/2011 rec F/U in 10 years     Compression fracture     Stable compression fractures L3 and T12     COPD (chronic obstructive pulmonary disease) (H)       Diverticulosis     sigmoid colon     Gastro-oesophageal reflux disease     Barrets     Hepatitis C, chronic (H)     Genotype 3A     Hiatal hernia     Jarquin's esophagus, Upper endoscopy 10/07 Intestinal metaplasiia without dysplasia     Osteoarthritis of lumbar spine     Chronic low back pain, mechanical     Osteoarthritis, hip, bilateral      Osteoporosis      Osteoporosis     lumbar spine on dexa     Portal hypertensive gastropathy (H)     EGD 10/31/2013     Sensorineural hearing loss       PAST SURGICAL HISTORY:   has a past surgical history that includes inguinal hernia repairs; back surgery to remove bone chip (2005); Esophagoscopy, gastroscopy, duodenoscopy (EGD), combined (10/31/2013); hernia repair; colonoscopy; Phacoemulsification clear cornea with standard intraocular lens implant (Left, 4/3/2015); cataract iol, rt/lt (Left, 04/03/2015); and Phacoemulsification clear cornea with standard intraocular lens implant (Right, 4/24/2015).  FAMILY HISTORY: family history includes Family History Negative in an other family member.  SOCIAL HISTORY:   reports that he quit smoking about 7 years ago. His smoking use included cigarettes. He has quit using smokeless tobacco. He reports that he does not drink alcohol and does not use drugs.  Patient's living condition: lives in an assisted living facility    Current Outpatient Medications   Medication Sig     acetaminophen (TYLENOL) 325 MG tablet Take 2 tablets (650 mg) by mouth 3 times daily     beclomethasone HFA (QVAR REDIHALER) 80 MCG/ACT inhaler Inhale 1 puff into the lungs 2 times daily     calcium carbonate (TUMS) 500 MG chewable tablet Take 1 tablet (500 mg) by mouth 2 times daily     diclofenac (VOLTAREN) 1 % GEL Apply 4 gm to affected area four times daily     finasteride (PROSCAR) 5 MG tablet Take 1 tablet (5 mg) by mouth daily     ipratropium - albuterol 0.5 mg/2.5 mg/3 mL (DUONEB) 0.5-2.5 (3) MG/3ML neb solution Take 1 vial (3 mLs) by nebulization 4  times daily     ipratropium - albuterol 0.5 mg/2.5 mg/3 mL (DUONEB) 0.5-2.5 (3) MG/3ML nebulization Take 1 vial (3 mLs) by nebulization every 4 hours as needed     mineral oil-white petrolatum (EUCERIN) CREA cream Apply topically daily     Multiple Vitamin (TAB-A-AMERICA PO) Take 1 tablet by mouth daily     nystatin (MYCOSTATIN) 100794 UNIT/GM external powder Apply topically 2 times daily     omeprazole (PRILOSEC) 40 MG DR capsule Take 1 capsule (40 mg) by mouth 2 times daily     ORDER FOR DME Equipment being ordered: 4-wheeled rolling walker     polyethylene glycol (MIRALAX) 17 g packet Take 17 g by mouth daily as needed for constipation     senna (SENEXON) 8.6 MG tablet Take 1 tablet by mouth 2 times daily as needed for constipation     Spacer/Aero-Holding Chambers (MICROCHAMBER) MISC 2 times daily     tamsulosin (FLOMAX) 0.4 MG capsule Take 1 capsule (0.4 mg) by mouth daily     VITAMIN D, CHOLECALCIFEROL, PO Take by mouth daily     zinc oxide (DESITIN) 20 % external ointment Apply topically as needed for dry skin or irritation     zinc oxide (DESITIN) 40 % external ointment Apply topically 4 times daily     albuterol (2.5 MG/3ML) 0.083% nebulizer solution Take 1 vial (2.5 mg) by nebulization every 6 hours as needed for shortness of breath / dyspnea or wheezing     albuterol (PROAIR HFA, PROVENTIL HFA, VENTOLIN HFA) 108 (90 BASE) MCG/ACT inhaler Inhale 2 puffs into the lungs every 6 hours as needed for shortness of breath / dyspnea     albuterol (PROVENTIL) (5 MG/ML) 0.5% nebulizer solution Take 0.5 mLs (2.5 mg) by nebulization every 6 hours as needed for wheezing or shortness of breath / dyspnea     alendronate (FOSAMAX) 70 MG tablet Take 1 tablet (70 mg) by mouth every 7 days Take with a full glass of water and do not eat or lay down for 30 minutes     ammonium lactate (AMLACTIN) 12 % cream Apply topically daily Daily after bath     beclomethasone (QVAR) 40 MCG/ACT Inhaler Inhale 2 puffs into the lungs 2 times  daily     calcium carb 1250 mg, 500 mg [place],/vitamin D 200 units (OSCAL WITH D) 500-200 MG-UNIT per tablet Take 1 tablet by mouth 2 times daily (with meals)     calcium carbonate (TUMS) 500 MG chewable tablet Take 1 tablet (500 mg) by mouth daily     econazole nitrate 1 % cream To feet and toenails twice a day.     fluocinonide (LIDEX) 0.05 % ointment Apply topically daily     fluticasone (FLOVENT HFA) 220 MCG/ACT inhaler Inhale 2 puffs into the lungs 2 times daily     fluticasone-salmeterol (ADVAIR-HFA) 115-21 MCG/ACT inhaler Inhale 2 puffs into the lungs daily     hydrocortisone 1 % cream Apply topically 2 times daily     Hypromellose (ARTIFICIAL TEARS OP) Apply to eye as needed     Multiple Vitamin (THERAGRAN OR) Take by mouth daily Theragran -M     omeprazole 20 MG tablet Take 1 tablet (20 mg) by mouth daily Take 30-60 minutes before a meal. (Patient taking differently: Take 2 tablets (40 mg) by mouth 2 times daily Take 30-60 minutes before a meal.)     prednisoLONE acetate (PRED FORTE) 1 % ophthalmic suspension 1 drop in surgical eye as directed, 4x daily after surgery for 1 week, 3x daily for 1 week, 2x daily for 1 week, daily for 1 week, then stop     Skin Protectants, Misc. (EUCERIN) cream Apply topically as needed for dry skin     triamcinolone (KENALOG) 0.1 % ointment Apply topically 2 times daily To legs, abdomen, arms, and back     No current facility-administered medications for this visit.     ROS:  4 point ROS including Respiratory, CV, GI and , other than that noted in the HPI,  is negative    Vitals:  /78   Pulse 75   Temp 98  F (36.7  C)   Resp 18   Wt 71.6 kg (157 lb 12.8 oz)   BMI 22.09 kg/m    Exam:  GENERAL APPEARANCE:  Alert, in no distress  ENT:  Mouth and posterior oropharynx normal, moist mucous membranes  EYES:  EOM, conjunctivae, lids, pupils and irises normal  RESP:  respiratory effort and palpation of chest normal, lungs clear to auscultation , no respiratory distress, in  anterior lobes  CV:  Palpation and auscultation of heart done , regular rate and rhythm, no murmur, rub, or gallop  ABDOMEN:  normal bowel sounds, soft, nontender, no hepatosplenomegaly or other masses  M/S:   No edema in BLE  SKIN:  Inspection of skin and subcutaneous tissue baseline, Palpation of skin and subcutaneous tissue baseline  NEURO:   Cranial nerves 2-12 are normal tested and grossly at patient's baseline  PSYCH:  Unable to assess as patient is nonverbal during visit    Lab/Diagnostic data:  Labs done in SNF are in [facility]. Please refer to them using [employer]/Care Everywhere.    ASSESSMENT/PLAN:  Cognitive Impairment. Does not speak during today's visit. No concerns per staff report. Requires total cares. Staff to assist with ADLs, medications, and meals.    Chronic Obstructive Pulmonary Disease. Continue DuoNeb and Beclomethasone as ordered.    Chronic Hepatitis C and Cirrhosis. Will need to clarify if patient follows with Gastroenterology. Takes Furosemide.    History of Jarquin's Esophagus. Continue Omeprazole as ordered.    Constipation. Continue Senna-S and Miralax as ordered.    Chronic Low Back Pain with History of L4 Compression Fracture. Continue Acetaminophen and Diclofenac as ordered.    Benign Prostatic Hyperplasia and History of Bladder Cancer. Previously followed by [facility]. Continue Tamsulosin and Finasteride as ordered.    Warm Antibody Hemolytic Anemia. Baseline Hemoglobin ~ 13. Monitor periodically.     Orders:  None    Electronically signed by:  LAMBERT Hernandez CNP

## 2022-10-19 PROBLEM — D59.11: Status: ACTIVE | Noted: 2022-01-01

## 2022-11-08 NOTE — LETTER
11/8/2022        RE: Ciaran Dong  [address]  [facility] [address]        [facility] GERIATRICS    Chief Complaint   Patient presents with     RECHECK     HPI:  Ciaran Dong is a 72 year old  (1950), who is being seen today for an episodic care visit at: [facility]) [[id number]].     Background:    This is a 72-year-old male, with a past medical history significant for COPD, celiac disease, cirrhosis, chronic Hepatitis C, Jarquin's esophagus, and cognitive impairment, who has elected to change providers to [facility].     Today's concern is:     Coccyx Wound. Today, staff reports patient has a wound on his right upper buttocks. Size of coin. Spends most of his time in his recliner. Requesting home care orders. Does not have a bed in his room. No reports of concern for infection. Visited with patient, who does not talk or respond to questions.    Allergies, and PMH/PSH reviewed in [facility] today.  REVIEW OF SYSTEMS:  Unobtainable secondary to cognitive impairment.     Objective:   /78   Pulse 75   Temp 98  F (36.7  C)   Resp 18   GENERAL APPEARANCE:  Alert, in no distress  ENT:  Mouth and posterior oropharynx normal, moist mucous membranes  EYES:  EOM, conjunctivae, lids, pupils and irises normal  RESP:  no respiratory distress  PSYCH:  Unable to assess    Labs done in SNF are in [facility]. Please refer to them using [employer]Care Everywhere.    Assessment/Plan:  Coccyx Wound. Likely due to pressure from amount of time spent in recliner. Will order Home RN for wound care and dressing changes    Orders:  Ok for Home RN    Electronically signed by: LAMBERT Hernandez CNP         Documentation of Face-to-Face and Certification for Home Health Services     Patient: Ciaran Dong   YOB: 1950  MR Number: [id number]  Today's Date: 11/10/2022    I certify that patient: Ciaran Dong is under my care and that I, or a nurse  practitioner or physician's assistant working with me, had a face-to-face encounter that meets the physician face-to-face encounter requirements with this patient on: 11/8/22.    This encounter with the patient was in whole, or in part, for the following medical condition, which is the primary reason for home health care: Coccyx Wound    I certify that, based on my findings, the following services are medically necessary home health services: Nursing.    My clinical findings support the need for the above services because: Nurse is needed: To provide assessment and oversight required in the home to assure adherence to the medical plan due to: coccyx wound..    Further, I certify that my clinical findings support that this patient is homebound (i.e. absences from home require considerable and taxing effort and are for medical reasons or [employer] services or infrequently or of short duration when for other reasons) because: Requires assistance of another person or specialized equipment to access medical services because patient: Is prone to wander/get lost without assistance...    Based on the above findings. I certify that this patient is confined to the home and needs intermittent skilled nursing care, physical therapy and/or speech therapy.  The patient is under my care, and I have initiated the establishment of the plan of care.  This patient will be followed by a physician who will periodically review the plan of care.  Physician/Provider to provide follow up care: Lucinda Dean NP    Responsible Medicare certified [employer] Physician: Dr. Radha Pinedo  Physician Signature: See electronic signature associated with these discharge orders.  Date: 11/10/2022          Sincerely,        LAMBERT Hernandez CNP

## 2022-11-11 NOTE — PROGRESS NOTES
[facility] GERIATRICS    Chief Complaint   Patient presents with     RECHECK     HPI:  Ciaran Dong is a 72 year old  (1950), who is being seen today for an episodic care visit at: [facility]) [[id number]].     Background:    This is a 72-year-old male, with a past medical history significant for COPD, celiac disease, cirrhosis, chronic Hepatitis C, Jarquin's esophagus, and cognitive impairment, who has elected to change providers to [facility].     Today's concern is:     Coccyx Wound. Today, staff reports patient has a wound on his right upper buttocks. Size of coin. Spends most of his time in his recliner. Requesting home care orders. Does not have a bed in his room. No reports of concern for infection. Visited with patient, who does not talk or respond to questions.    Chronic Low Back Pain with History of L4 Compression Fracture and Chronic Obstructive Pulmonary Disease. Home care is working with patient and recommending a hospital bed and manual wheelchair    Allergies, and PMH/PSH reviewed in EPIC today.  REVIEW OF SYSTEMS:  Unobtainable secondary to cognitive impairment.     Objective:   /78   Pulse 75   Temp 98  F (36.7  C)   Resp 18   GENERAL APPEARANCE:  Alert, in no distress  ENT:  Mouth and posterior oropharynx normal, moist mucous membranes  EYES:  EOM, conjunctivae, lids, pupils and irises normal  RESP:  no respiratory distress  PSYCH:  Unable to assess    Labs done in SNF are in [facility] EPIC. Please refer to them using EPIC/Care Everywhere.    Assessment/Plan:  Coccyx Wound. Likely due to pressure from amount of time spent in recliner. Will order Home RN for wound care and dressing changes    Chronic Low Back Pain with History of L4 Compression Fracture and Chronic Obstructive Pulmonary Disease. Therapy is recommending a hospital bed and wheelchair for mobility and transfers.    Orders:  Ok for Home RN    Electronically signed by: Lucinda  LAMBERT Childers CNP         Documentation of Face-to-Face and Certification for Home Health Services     Patient: Ciaran Dong   YOB: 1950  MR Number: [id number]  Today's Date: 11/10/2022    I certify that patient: Ciaran Dong is under my care and that I, or a nurse practitioner or physician's assistant working with me, had a face-to-face encounter that meets the physician face-to-face encounter requirements with this patient on: 11/8/22.    This encounter with the patient was in whole, or in part, for the following medical condition, which is the primary reason for home health care: Coccyx Wound    I certify that, based on my findings, the following services are medically necessary home health services: Nursing.    My clinical findings support the need for the above services because: Nurse is needed: To provide assessment and oversight required in the home to assure adherence to the medical plan due to: coccyx wound..    Further, I certify that my clinical findings support that this patient is homebound (i.e. absences from home require considerable and taxing effort and are for medical reasons or [employer] services or infrequently or of short duration when for other reasons) because: Requires assistance of another person or specialized equipment to access medical services because patient: Is prone to wander/get lost without assistance...    Based on the above findings. I certify that this patient is confined to the home and needs intermittent skilled nursing care, physical therapy and/or speech therapy.  The patient is under my care, and I have initiated the establishment of the plan of care.  This patient will be followed by a physician who will periodically review the plan of care.  Physician/Provider to provide follow up care: Lucinda Dean NP    Responsible Medicare certified PECOS Physician: Dr. Radha Pinedo  Physician Signature: See electronic signature associated with these  discharge orders.  Date: 11/10/2022

## 2022-12-05 NOTE — PROGRESS NOTES
DME (Durable Medical Equipment) Orders and Documentation  Orders Placed This Encounter   Procedures     Wheelchair Order     Hospital Bed Order      The patient was assessed and it was determined the patient is in need of the following listed DME Supplies/Equipment. Please complete supporting documentation below to demonstrate medical necessity.        Face to Face and Medical Necessity Statement for DME Provider visit    Demographic Information on Ciaran Dong:  Gender: male  : 1950  [address]  [facility] [address]  [phone] (home)     Medical Record: [id number]  Social Security Number: [id number]  Primary Care Provider: Katerin Cavazos  Insurance: Payor: [facility] / Plan: [facility] DUAL / Product Type: HMO /     HPI:   Ciaran Dong is a 72 year old  (1950), who is being seen today for a face to face provider visit at [facility]; medical necessity statement for DME included. This patient requires the following:  DME Ordered and Medical Necessity Statement      Wheelchair Documentation  Size: standard 16 x 16  Corresponding cushion: Yes: standard  Standard foot rests: Yes  Elevating leg rests: No  Arm rests: Yes: standard  Lap tray: No  Dose the patient use oxygen? No   Is the patient able to propel wheelchair? Yes  1. The patient has mobility limitations that impairs their ability to participate in one or more mobility related activities: Toileting, Feeding, Grooming and Bathing.  The wheelchair is suitable and necessary for use in the patient's home.  2. The patient's mobility limitations cannot be safely resolved by using a cane/walker:Yes    Reason why a cane or walker will not meet the patient's needs. Balance, tolerance  3. The patients home has adequate access to use a manual wheelchair:Yes  4. The use of a manual wheelchair on a regular basis will improve the patients ability to participate in mobility related ADL's at home:Yes  5. The patient is willing to  use a manual wheelchair at home:Yes  6. The patient has adequate upper body strength and the mental capability to safely use a manual wheelchair and/or has a caregiver that is able to assist: Yes  7. Does the patient have a lower extremity injury or edema?No     Hospital Bed/Accessories Documentation  Hospital bed is required for body positioning, to allow for safe transfers to chair and standing and frequent changes in body position, not feasible in an ordinary bed    1. Does the patient require positioning of the body in ways not feasible with an ordinary bed due to a medical condition that is expected to last at least 1 month? Yes  2. Does the patient require, for the alleviation of pain, positioning of the body in ways not feasible with an ordinary bed? Yes  3. Does the patient require the head of bed to be elevated more than 30 degrees most of the time due to congestive heart failure, chronic pulmonary disease or aspiration? Yes  4. Does the patient require traction that can only be attached to a hospital bed?  No  5. Does the patient require a bed height different than a fixed height hospital bed to permit transfers to a chair, wheelchair, or standing position? Yes  6. Does the patient require frequent changes in body position and/or have an immediate need for change in body position? Yes    Pt needing above DME with expected length of need of 99  months  due to medical necessity associated with following diagnosis:     Open wound  Chronic obstructive pulmonary disease, unspecified COPD type (H)  Chronic low back pain, unspecified back pain laterality, unspecified whether sciatica present  History of compression fracture of spine      PM   has a past medical history of Jarquin's esophagus, Colon polyps, Compression fracture, COPD (chronic obstructive pulmonary disease) (H), Diverticulosis, Gastro-oesophageal reflux disease, Hepatitis C, chronic (H), Hiatal hernia, Osteoarthritis of lumbar spine,  Osteoarthritis, hip, bilateral, Osteoporosis, Osteoporosis, Portal hypertensive gastropathy (H), and Sensorineural hearing loss.    He has no past medical history of Malignant hyperthermia or PONV (postoperative nausea and vomiting).    ROS:4 point ROS including Respiratory, CV, GI and , other than that noted in the HPI,  is negative    ASSESSMENT/PLAN:  1. Open wound    2. Chronic obstructive pulmonary disease, unspecified COPD type (H)    3. Chronic low back pain, unspecified back pain laterality, unspecified whether sciatica present    4. History of compression fracture of spine      Orders:  1. 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   Primary:    [id number]      Order Date:  Dec 5, 2022        Durable Medical Equipment Order:    Hospital Bed Order [[id number]]    Electronically signed by: Lucinda Dean APRN CNP on 22   Ordering user: Lucinda Dean APRN CNP 22 1413   Diagnoses   Chronic obstructive pulmonary disease, unspecified COPD type (H) [J44.9]   Chronic low back pain, unspecified back pain laterality, unspecified whether sciatica present [M54.50, G89.29]   History of compression fracture of spine [Z87.81]   Questionnaire    Question Answer   Start Date: 2022   Hospital Bed Type: Semi Electric   Length of Need: Lifetime   Order comments: I, the undersigned,  "certify that the above prescribed supplies are medically necessary for this patient and is both reasonable and necessary in reference to accepted standards of medical and necessary in reference to accepted standards of medical practice in the treatment of this patient's condition and is not prescribed as a convenience.      Height: 5' 10\"  Weight: 160 lbs 0 oz     Authorizing provider infomation:  Lucinda Dean APRN CNP   (NPI:[id number])     Durable Medical Equipment Order:    Wheelchair Order [[id number]]    Electronically signed by: Lucinda Dean APRN CNP on 12/05/22 1413   Ordering user: Lucinda Dean APRN CNP 12/05/22 1413   Diagnoses   Chronic obstructive pulmonary disease, unspecified COPD type (H) [J44.9]   Chronic low back pain, unspecified back pain laterality, unspecified whether sciatica present [M54.50, G89.29]   History of compression fracture of spine [Z87.81]   Questionnaire    Question Answer   Start Date: 12/5/2022   Wheelchair Type: Standard   Length of Need: Lifetime   Leg Rests: Standard   Arm Rests: Standard   The face to face evaluation was performed on: 12/5/2022   Order comments: I, the undersigned, certify that the above prescribed supplies are medically necessary for this patient and is both reasonable and necessary in reference to accepted standards of medical and necessary in reference to accepted standards of medical practice in the treatment of this patient's condition and is not prescribed as a convenience.      Height: 5' 10\"  Weight: 160 lbs 0 oz     Authorizing provider infomation:  Lucinda Dean APRN CNP   (NPI:[id number])                                "

## 2022-12-09 NOTE — TELEPHONE ENCOUNTER
[facility] Triage Nurse Telephone Encounter    Provider: LAMBERT Phillips DNP  Facility: [facility]  Facility Type:  AL    Caller: Lalit(home ST)  Call Back Number: [phone]    Allergies:    Allergies   Allergen Reactions     Bee Venom Anaphylaxis     Chicken Allergy Anaphylaxis     Influenza Vaccines Other (See Comments)     Patient lost consciousness at home after receiving the immunization. He was hospitalized overnight.     Acetaminophen Other (See Comments)     Liver function.     Gluten Nausea and Vomiting     Nsaids      Seasonal Allergies      Shellfish Allergy      Difficulty swallowing     Tolmetin Nausea and Vomiting     Other reaction(s): GI intolerance     Triple Buffered Aspirin [Aspirin Buffered] GI Disturbance        Reason for call: Speech therapy is requesting orders for home ST.      Verbal Order/Direction given by Provider: Ok for speech therapy to eval and treat.      Provider giving Order:  LAMBERT Phillips DNP    Verbal Order given to: Lalit Red RN

## 2022-12-13 PROBLEM — Z87.19 HISTORY OF BARRETT'S ESOPHAGUS: Status: ACTIVE | Noted: 2019-08-07

## 2022-12-13 NOTE — PROGRESS NOTES
Documentation of Face to Face and Certification for Home Health Services    I certify that patient: Ciaran Dong is under my care and that I, or a nurse practitioner or physician's assistant working with me, had a face-to-face encounter that meets the physician face-to-face encounter requirements with this patient on: 12/13/2022.    This encounter with the patient was in whole, or in part, for the following medical condition, which is the primary reason for home health care: The primary encounter diagnosis was Dysphagia, unspecified type. Diagnoses of Edentulism, complete, class IV, History of Jarquin's esophagus, and Diaphragmatic hernia without obstruction and without gangrene were also pertinent to this visit.    I certify that, based on my findings, the following services are medically necessary home health services: Speech Language Therapy.    My clinical findings support the need for the above services because: Speech Therapy Services are needed to assess and treat impairments in language and/or swallow functions due to above noted diagnoses.    Further, I certify that my clinical findings support that this patient is homebound (i.e. absences from home require considerable and taxing effort and are for medical reasons or [employer] services or infrequently or of short duration when for other reasons) because: Leaving home is medically contraindicated for the following reason(s): Pressure on open wounds during transport impairs healing process...    Based on the above findings. I certify that this patient is confined to the home and needs intermittent skilled nursing care, physical therapy and/or speech therapy.  The patient is under my care, and I have initiated the establishment of the plan of care.  This patient will be followed by a physician who will periodically review the plan of care.  Physician/Provider to provide follow up care: Katerin Cavazos    Attending hospital physician (the Medicare  certified PECOS provider): Dr.Sara Shahida MD, signing F2F and only signing for initial order. Please send all follow up questions and concerns or needed follow up signatures to the PCP, who [facility] has on file as:  Katerin Cavazos.  Physician Signature: See electronic signature associated with these discharge orders.    Date: 12/13/2022

## 2022-12-13 NOTE — LETTER
12/13/2022        RE: Ciaran Dong  [address] [address]        Documentation of Face to Face and Certification for Home Health Services    I certify that patient: Ciaran Dong is under my care and that I, or a nurse practitioner or physician's assistant working with me, had a face-to-face encounter that meets the physician face-to-face encounter requirements with this patient on: 12/13/2022.    This encounter with the patient was in whole, or in part, for the following medical condition, which is the primary reason for home health care: The primary encounter diagnosis was Dysphagia, unspecified type. Diagnoses of Edentulism, complete, class IV, History of Jarquin's esophagus, and Diaphragmatic hernia without obstruction and without gangrene were also pertinent to this visit.    I certify that, based on my findings, the following services are medically necessary home health services: Speech Language Therapy.    My clinical findings support the need for the above services because: Speech Therapy Services are needed to assess and treat impairments in language and/or swallow functions due to above noted diagnoses.    Further, I certify that my clinical findings support that this patient is homebound (i.e. absences from home require considerable and taxing effort and are for medical reasons or [employer]ab[employer] services or infrequently or of short duration when for other reasons) because: Leaving home is medically contraindicated for the following reason(s): Pressure on open wounds during transport impairs healing process...    Based on the above findings. I certify that this patient is confined to the home and needs intermittent skilled nursing care, physical therapy and/or speech therapy.  The patient is under my care, and I have initiated the establishment of the plan of care.  This patient will be followed by a physician who will periodically review the plan of care.  Physician/Provider to  provide follow up care: Katerin Cavazos    Attending hospital physician (the Medicare certified PECOS provider): Dr.Sara Shahida MD, signing F2F and only signing for initial order. Please send all follow up questions and concerns or needed follow up signatures to the PCP, who [facility] has on file as:  Katerin Cavazos.  Physician Signature: See electronic signature associated with these discharge orders.    Date: 12/13/2022        Sincerely,        LAMBERT Mckeon CNP

## 2022-12-27 NOTE — PROGRESS NOTES
[facility] GERIATRICS    Chief Complaint   Patient presents with     LILLIE     [place] Medical Record Number:  [id number]  Place of Service where encounter took place:  [facility] to [facility]) [[id number]]    HPI:  Ciaran Dong is a 72 year old  (1950), who is being seen today for an episodic care visit at: [facility] ([facility]) [[id number]]. Today's concern is:      COVID-19  Chronic obstructive pulmonary disease, unspecified COPD type (H)  Hypoxia  Cognitive impairment    Patient seen today for an acute visit in [facility] per request of the staff. RA noting decreased SaO2 levels on baseline O2 with recent dx of COVID. Patient with congested cough during visit. Patient nonverbal to writer - does make eye contact, but no response to writer's questions - per nursing this is his baseline. He appears to be comfortably watching television with no acute concerns or discomforts visible. Is not on anti-viral medications; POA not interested. Does have chronic COPD. Appetite is at baseliene. Staff deny s/sx of CP, palpitations, fatigue, nausea, vomiting, fever, chills, and/or b/b concerns today.    ALLERGIES:Bee venom, Chicken allergy, Influenza vaccines, Acetaminophen, Gluten, Nsaids, Seasonal allergies, Shellfish allergy, Tolmetin, and Triple buffered aspirin [aspirin buffered]  PAST MEDICAL HISTORY:   Past Medical History:   Diagnosis Date     Jarquin's esophagus     associated hiatal hernia     Colon polyps     negative colonoscopy 7/2011 rec F/U in 10 years     Compression fracture     Stable compression fractures L3 and T12     COPD (chronic obstructive pulmonary disease) (H)      Diverticulosis     sigmoid colon     Gastro-oesophageal reflux disease     Barrets     Hepatitis C, chronic (H)     Genotype 3A     Hiatal hernia     Jarquin's esophagus, Upper endoscopy 10/07 Intestinal metaplasiia without dysplasia     Osteoarthritis of lumbar spine     Chronic low back pain, mechanical     Osteoarthritis,  hip, bilateral      Osteoporosis      Osteoporosis     lumbar spine on dexa     Portal hypertensive gastropathy (H)     EGD 10/31/2013     Sensorineural hearing loss      PAST SURGICAL HISTORY:   has a past surgical history that includes inguinal hernia repairs; back surgery to remove bone chip (2005); Esophagoscopy, gastroscopy, duodenoscopy (EGD), combined (10/31/2013); hernia repair; colonoscopy; Phacoemulsification clear cornea with standard intraocular lens implant (Left, 4/3/2015); cataract iol, rt/lt (Left, 04/03/2015); and Phacoemulsification clear cornea with standard intraocular lens implant (Right, 4/24/2015).  FAMILY HISTORY: family history includes Family History Negative in an other family member.  SOCIAL HISTORY:  reports that he quit smoking about 7 years ago. His smoking use included cigarettes. He has quit using smokeless tobacco. He reports that he does not drink alcohol and does not use drugs.    MEDICATIONS:  Current Outpatient Medications   Medication Sig Dispense Refill     acetaminophen (TYLENOL) 325 MG tablet Take 2 tablets (650 mg) by mouth 3 times daily       beclomethasone HFA (QVAR REDIHALER) 80 MCG/ACT inhaler Inhale 1 puff into the lungs 2 times daily       calcium carbonate (TUMS) 500 MG chewable tablet Take 1 tablet (500 mg) by mouth 2 times daily       diclofenac (VOLTAREN) 1 % GEL Apply 4 gm to affected area four times daily 100 g 2     finasteride (PROSCAR) 5 MG tablet Take 1 tablet (5 mg) by mouth daily       furosemide (LASIX) 20 MG tablet Take 20 mg by mouth daily       ipratropium - albuterol 0.5 mg/2.5 mg/3 mL (DUONEB) 0.5-2.5 (3) MG/3ML neb solution Take 1 vial (3 mLs) by nebulization 4 times daily       ipratropium - albuterol 0.5 mg/2.5 mg/3 mL (DUONEB) 0.5-2.5 (3) MG/3ML nebulization Take 1 vial (3 mLs) by nebulization every 4 hours as needed  2     mineral oil-white petrolatum (EUCERIN) CREA cream Apply topically daily       Multiple Vitamin (TAB-A-AMERICA PO) Take 1 tablet  "by mouth daily       nystatin (MYCOSTATIN) 623878 UNIT/GM external powder Apply topically 2 times daily       omeprazole (PRILOSEC) 40 MG DR capsule Take 1 capsule (40 mg) by mouth 2 times daily       polyethylene glycol (MIRALAX) 17 g packet Take 17 g by mouth daily as needed for constipation       senna-docusate (SENOKOT-S/PERICOLACE) 8.6-50 MG tablet Take 1 tablet by mouth 2 times daily as needed for constipation       Spacer/Aero-Holding Chambers (MICROCHAMBER) MISC 2 times daily       tamsulosin (FLOMAX) 0.4 MG capsule Take 1 capsule (0.4 mg) by mouth daily       VITAMIN D, CHOLECALCIFEROL, PO Take 50,000 Units by mouth once a week On Wednesday       zinc oxide (DESITIN) 40 % external ointment Apply topically 2 times daily         MED REC REQUIRED  Post Medication Reconciliation Status:  Patient was not discharged from an inpatient facility or TCU      REVIEW OF SYSTEMS:  Unobtainable secondary to cognitive impairment.     Objective:   BP (!) 165/97   Pulse 86   Temp 97.3  F (36.3  C)   Resp 20   Ht 1.778 m (5' 10\")   Wt 72.6 kg (160 lb)   SpO2 91%   BMI 22.96 kg/m    GENERAL APPEARANCE:  Alert, in no distress, thin, somnolent, uncooperative, elderly male sitting in recliner in apartment watching television  EYES:  EOM, conjunctivae, lids, pupils and irises normal  NECK:  No adenopathy,masses or thyromegaly  RESP:  rhonchi BUL, cough - harsh, moist, expiratory wheezes, inspiratory wheezes, using accessory muscles  CV:  Palpation and auscultation of heart done , regular rate and rhythm, no murmur, rub, or gallop, no edema  M/S:   Gait and station abnormal - JENAE 2/2 patient being in recliner  Digits and nails abnormal - arthritic changes present  SKIN:  Inspection of skin and subcutaneous tissue baseline, Palpation of skin and subcutaneous tissue baseline, skin thin, dry and fragile  PSYCH:  oriented to self, insight and judgement impaired, memory impaired , affect abnormal - withdrawn, guarded    Recent " labs in [facility] reviewed by me today.   CBC RESULTS:   Recent Labs   Lab Test 06/10/22  1420 01/22/15  0820   WBC 6.3 4.1   RBC 4.10* 4.19*   HGB 12.7* 13.8   HCT 39.0* 41.0   MCV 95 98   MCH 31.0 32.9   MCHC 32.6 33.7   RDW 13.2 14.4    88*       Last Basic Metabolic Panel:  Recent Labs   Lab Test 06/10/22  1420 01/22/15  0820    138   POTASSIUM 4.0 4.7   CHLORIDE 96* 107   THA 10.0 9.2   CO2 30 26   BUN 20 27   CR 0.77 0.78    106*       Liver Function Studies -   Recent Labs   Lab Test 05/13/22  1125 01/22/15  0820   PROTTOTAL 8.0 8.8   ALBUMIN 3.6 3.4   BILITOTAL 1.0  1.0 0.7   ALKPHOS 92 89   AST 30  30 Unsatisfactory specimen - hemolyzed   ALT 60*  60* 40       TSH   Date Value Ref Range Status   07/01/2013 0.76 0.4 - 5.0 mU/L Final       Assessment/Plan:    ICD-10-CM    1. COVID-19  U07.1 Acute - unstable; As per HPI. Staff spoke   2. Chronic obstructive pulmonary disease, unspecified COPD type (H)  J44.9 To POA and they came to visit, they feel resident appears comfortable at this time,    3. Hypoxia  R09.02 They were not interested in COVID tx at   4. Cognitive impairment  R41.89 This time - Will change scheduled nebs to Combivent inhaler QID while on COVID precautions. Staff to update with further needs.        Electronically signed by:   Dr. Deyanira Cavazos, APRN, DNP, AGNP-BC, PMHNP-BC  [facility]  Office Hours: Tues-Fri 9511-1738  Office: [address] [place] [address]  Fax - [phone]  Triage Phone- [phone]  Business Office- [phone]      Email: [email or web address]

## 2022-12-27 NOTE — LETTER
12/27/2022        RE: Ciaran Dong  [address]  [place] [address]        [facility] GERIATRICS    Chief Complaint   Patient presents with     ÁNGELECK     [place] Medical Record Number:  [id number]  Place of Service where encounter took place:  [facility]) [[id number]]    HPI:  Ciaran Dong is a 72 year old  (1950), who is being seen today for an episodic care visit at: [facility]) [[id number]]. Today's concern is:      COVID-19  Chronic obstructive pulmonary disease, unspecified COPD type (H)  Hypoxia  Cognitive impairment    Patient seen today for an acute visit in [facility] per request of the staff. RA noting decreased SaO2 levels on baseline O2 with recent dx of COVID. Patient with congested cough during visit. Patient nonverbal to writer - does make eye contact, but no response to writer's questions - per nursing this is his baseline. He appears to be comfortably watching television with no acute concerns or discomforts visible. Is not on anti-viral medications; POA not interested. Does have chronic COPD. Appetite is at baseliene. Staff deny s/sx of CP, palpitations, fatigue, nausea, vomiting, fever, chills, and/or b/b concerns today.    ALLERGIES:Bee venom, Chicken allergy, Influenza vaccines, Acetaminophen, Gluten, Nsaids, Seasonal allergies, Shellfish allergy, Tolmetin, and Triple buffered aspirin [aspirin buffered]  PAST MEDICAL HISTORY:   Past Medical History:   Diagnosis Date     Jarquin's esophagus     associated hiatal hernia     Colon polyps     negative colonoscopy 7/2011 rec F/U in 10 years     Compression fracture     Stable compression fractures L3 and T12     COPD (chronic obstructive pulmonary disease) (H)      Diverticulosis     sigmoid colon     Gastro-oesophageal reflux disease     Barrets     Hepatitis C, chronic (H)     Genotype 3A     Hiatal hernia     Jarquin's esophagus, Upper endoscopy 10/07 Intestinal metaplasiia without dysplasia      Osteoarthritis of lumbar spine     Chronic low back pain, mechanical     Osteoarthritis, hip, bilateral      Osteoporosis      Osteoporosis     lumbar spine on dexa     Portal hypertensive gastropathy (H)     EGD 10/31/2013     Sensorineural hearing loss      PAST SURGICAL HISTORY:   has a past surgical history that includes inguinal hernia repairs; back surgery to remove bone chip (2005); Esophagoscopy, gastroscopy, duodenoscopy (EGD), combined (10/31/2013); hernia repair; colonoscopy; Phacoemulsification clear cornea with standard intraocular lens implant (Left, 4/3/2015); cataract iol, rt/lt (Left, 04/03/2015); and Phacoemulsification clear cornea with standard intraocular lens implant (Right, 4/24/2015).  FAMILY HISTORY: family history includes Family History Negative in an other family member.  SOCIAL HISTORY:  reports that he quit smoking about 7 years ago. His smoking use included cigarettes. He has quit using smokeless tobacco. He reports that he does not drink alcohol and does not use drugs.    MEDICATIONS:  Current Outpatient Medications   Medication Sig Dispense Refill     acetaminophen (TYLENOL) 325 MG tablet Take 2 tablets (650 mg) by mouth 3 times daily       beclomethasone HFA (QVAR REDIHALER) 80 MCG/ACT inhaler Inhale 1 puff into the lungs 2 times daily       calcium carbonate (TUMS) 500 MG chewable tablet Take 1 tablet (500 mg) by mouth 2 times daily       diclofenac (VOLTAREN) 1 % GEL Apply 4 gm to affected area four times daily 100 g 2     finasteride (PROSCAR) 5 MG tablet Take 1 tablet (5 mg) by mouth daily       furosemide (LASIX) 20 MG tablet Take 20 mg by mouth daily       ipratropium - albuterol 0.5 mg/2.5 mg/3 mL (DUONEB) 0.5-2.5 (3) MG/3ML neb solution Take 1 vial (3 mLs) by nebulization 4 times daily       ipratropium - albuterol 0.5 mg/2.5 mg/3 mL (DUONEB) 0.5-2.5 (3) MG/3ML nebulization Take 1 vial (3 mLs) by nebulization every 4 hours as needed  2     mineral oil-white petrolatum  "(EUCERIN) CREA cream Apply topically daily       Multiple Vitamin (TAB-A-AMERICA PO) Take 1 tablet by mouth daily       nystatin (MYCOSTATIN) 350560 UNIT/GM external powder Apply topically 2 times daily       omeprazole (PRILOSEC) 40 MG DR capsule Take 1 capsule (40 mg) by mouth 2 times daily       polyethylene glycol (MIRALAX) 17 g packet Take 17 g by mouth daily as needed for constipation       senna-docusate (SENOKOT-S/PERICOLACE) 8.6-50 MG tablet Take 1 tablet by mouth 2 times daily as needed for constipation       Spacer/Aero-Holding Chambers (MICROCHAMBER) MISC 2 times daily       tamsulosin (FLOMAX) 0.4 MG capsule Take 1 capsule (0.4 mg) by mouth daily       VITAMIN D, CHOLECALCIFEROL, PO Take 50,000 Units by mouth once a week On Wednesday       zinc oxide (DESITIN) 40 % external ointment Apply topically 2 times daily         MED REC REQUIRED  Post Medication Reconciliation Status:  Patient was not discharged from an inpatient facility or TCU      REVIEW OF SYSTEMS:  Unobtainable secondary to cognitive impairment.     Objective:   BP (!) 165/97   Pulse 86   Temp 97.3  F (36.3  C)   Resp 20   Ht 1.778 m (5' 10\")   Wt 72.6 kg (160 lb)   SpO2 91%   BMI 22.96 kg/m    GENERAL APPEARANCE:  Alert, in no distress, thin, somnolent, uncooperative, elderly male sitting in recliner in apartment watching television  EYES:  EOM, conjunctivae, lids, pupils and irises normal  NECK:  No adenopathy,masses or thyromegaly  RESP:  rhonchi BUL, cough - harsh, moist, expiratory wheezes, inspiratory wheezes, using accessory muscles  CV:  Palpation and auscultation of heart done , regular rate and rhythm, no murmur, rub, or gallop, no edema  M/S:   Gait and station abnormal - JENAE 2/2 patient being in recliner  Digits and nails abnormal - arthritic changes present  SKIN:  Inspection of skin and subcutaneous tissue baseline, Palpation of skin and subcutaneous tissue baseline, skin thin, dry and fragile  PSYCH:  oriented to self, " insight and judgement impaired, memory impaired , affect abnormal - withdrawn, guarded    Recent labs in [facility] reviewed by me today.   CBC RESULTS:   Recent Labs   Lab Test 06/10/22  1420 01/22/15  0820   WBC 6.3 4.1   RBC 4.10* 4.19*   HGB 12.7* 13.8   HCT 39.0* 41.0   MCV 95 98   MCH 31.0 32.9   MCHC 32.6 33.7   RDW 13.2 14.4    88*       Last Basic Metabolic Panel:  Recent Labs   Lab Test 06/10/22  1420 01/22/15  0820    138   POTASSIUM 4.0 4.7   CHLORIDE 96* 107   THA 10.0 9.2   CO2 30 26   BUN 20 27   CR 0.77 0.78    106*       Liver Function Studies -   Recent Labs   Lab Test 05/13/22  1125 01/22/15  0820   PROTTOTAL 8.0 8.8   ALBUMIN 3.6 3.4   BILITOTAL 1.0  1.0 0.7   ALKPHOS 92 89   AST 30  30 Unsatisfactory specimen - hemolyzed   ALT 60*  60* 40       TSH   Date Value Ref Range Status   07/01/2013 0.76 0.4 - 5.0 mU/L Final       Assessment/Plan:    ICD-10-CM    1. COVID-19  U07.1 Acute - unstable; As per HPI. Staff spoke   2. Chronic obstructive pulmonary disease, unspecified COPD type (H)  J44.9 To POA and they came to visit, they feel resident appears comfortable at this time,    3. Hypoxia  R09.02 They were not interested in COVID tx at   4. Cognitive impairment  R41.89 This time - Will change scheduled nebs to Combivent inhaler QID while on COVID precautions. Staff to update with further needs.        Electronically signed by:   LAMBERT Stallings, DNP, AGNP-BC, PMHNP-BC  [employer] [facility]  Office Hours: Tues-Fri 2704-6240  Office: [address] [place] [address]  Fax - [phone]  Triage Phone- [phone]  Business Office- [phone]      Email: [email or web address]             Sincerely,        LAMBERT Mckeon CNP

## 2023-01-01 ENCOUNTER — ASSISTED LIVING VISIT (OUTPATIENT)
Dept: GERIATRICS | Facility: CLINIC | Age: 73
End: 2023-01-01
Payer: COMMERCIAL

## 2023-01-01 ENCOUNTER — TELEPHONE (OUTPATIENT)
Dept: GERIATRICS | Facility: CLINIC | Age: 73
End: 2023-01-01

## 2023-01-01 VITALS
WEIGHT: 160 LBS | SYSTOLIC BLOOD PRESSURE: 165 MMHG | RESPIRATION RATE: 16 BRPM | HEIGHT: 70 IN | HEART RATE: 86 BPM | TEMPERATURE: 97.3 F | OXYGEN SATURATION: 91 % | BODY MASS INDEX: 22.9 KG/M2 | DIASTOLIC BLOOD PRESSURE: 97 MMHG

## 2023-01-01 VITALS
TEMPERATURE: 97.3 F | HEIGHT: 70 IN | OXYGEN SATURATION: 91 % | WEIGHT: 160 LBS | HEART RATE: 86 BPM | BODY MASS INDEX: 22.9 KG/M2 | RESPIRATION RATE: 16 BRPM | DIASTOLIC BLOOD PRESSURE: 97 MMHG | SYSTOLIC BLOOD PRESSURE: 165 MMHG

## 2023-01-01 VITALS
DIASTOLIC BLOOD PRESSURE: 97 MMHG | SYSTOLIC BLOOD PRESSURE: 165 MMHG | WEIGHT: 160 LBS | HEART RATE: 86 BPM | HEIGHT: 70 IN | BODY MASS INDEX: 22.9 KG/M2 | RESPIRATION RATE: 16 BRPM | TEMPERATURE: 97.3 F

## 2023-01-01 DIAGNOSIS — J44.9 CHRONIC OBSTRUCTIVE PULMONARY DISEASE, UNSPECIFIED COPD TYPE (H): Primary | ICD-10-CM

## 2023-01-01 DIAGNOSIS — J12.82 PNEUMONIA DUE TO 2019 NOVEL CORONAVIRUS: ICD-10-CM

## 2023-01-01 DIAGNOSIS — J44.9 CHRONIC OBSTRUCTIVE PULMONARY DISEASE, UNSPECIFIED COPD TYPE (H): ICD-10-CM

## 2023-01-01 DIAGNOSIS — G31.84 MCI (MILD COGNITIVE IMPAIRMENT): ICD-10-CM

## 2023-01-01 DIAGNOSIS — I73.9 PERIPHERAL VASCULAR DISEASE (H): ICD-10-CM

## 2023-01-01 DIAGNOSIS — U07.1 PNEUMONIA DUE TO 2019 NOVEL CORONAVIRUS: ICD-10-CM

## 2023-01-01 DIAGNOSIS — K74.60 HEPATIC CIRRHOSIS, UNSPECIFIED HEPATIC CIRRHOSIS TYPE, UNSPECIFIED WHETHER ASCITES PRESENT (H): ICD-10-CM

## 2023-01-01 DIAGNOSIS — U07.1 COVID-19: Primary | ICD-10-CM

## 2023-01-01 DIAGNOSIS — R09.89 CHEST CONGESTION: ICD-10-CM

## 2023-01-01 DIAGNOSIS — R09.02 HYPOXIA: ICD-10-CM

## 2023-01-01 DIAGNOSIS — U07.1 COVID-19: ICD-10-CM

## 2023-01-01 PROCEDURE — 99350 HOME/RES VST EST HIGH MDM 60: CPT | Mod: CS | Performed by: NURSE PRACTITIONER

## 2023-01-01 RX ORDER — DEXAMETHASONE 6 MG/1
6 TABLET ORAL DAILY
COMMUNITY
Start: 2023-01-01 | End: 2023-01-23

## 2023-01-01 RX ORDER — GUAIFENESIN 600 MG/1
600 TABLET, EXTENDED RELEASE ORAL 2 TIMES DAILY
COMMUNITY

## 2023-01-05 NOTE — LETTER
"    1/5/2023        RE: Ciaran Dong  [address]  [facility] [address]        [facility] GERIATRICS    Chief Complaint   Patient presents with     RECHECK     [place] Medical Record Number:  [id number]  Place of Service where encounter took place:  [facility]) [[id number]]    HPI:  Ciaran Dong is a 72 year old  (1950), who is being seen today for an episodic care visit at: [facility] ([facility]) [[id number]]. Today's concern is:      COVID-19  Chronic obstructive pulmonary disease, unspecified COPD type (H)  Hypoxia  Pneumonia due to 2019 novel coronavirus  Hepatic cirrhosis, unspecified hepatic cirrhosis type, unspecified whether ascites present (H)  Peripheral vascular disease (H)    Patient seen today for a follow-up visit in [facility]. Staff note increased SOB, CHU and congestion in resident following recent infection with COVID-18. Patient with minimal verbal response today; does note \"yes\" when asked if he's feeling more SOB. Appetite is at baseline. He is sleeping well. Denies CP, palpitations, fatigue, nausea, vomiting, increased SOB/CHU, fever, chills, and/or b/b concerns today. Staff note increased concerns re:weakness following recent COVID dx - questioning if he may benefit from therapy for strengthening.    ALLERGIES:Bee venom, Chicken allergy, Influenza vaccines, Acetaminophen, Gluten, Nsaids, Seasonal allergies, Shellfish allergy, Tolmetin, and Triple buffered aspirin [aspirin buffered]  PAST MEDICAL HISTORY:   Past Medical History:   Diagnosis Date     Jarquin's esophagus     associated hiatal hernia     Colon polyps     negative colonoscopy 7/2011 rec F/U in 10 years     Compression fracture     Stable compression fractures L3 and T12     COPD (chronic obstructive pulmonary disease) (H)      Diverticulosis     sigmoid colon     Gastro-oesophageal reflux disease     Barrets     Hepatitis C, chronic (H)     Genotype 3A     Hiatal hernia     Jarquin's esophagus, " Upper endoscopy 10/07 Intestinal metaplasiia without dysplasia     Osteoarthritis of lumbar spine     Chronic low back pain, mechanical     Osteoarthritis, hip, bilateral      Osteoporosis      Osteoporosis     lumbar spine on dexa     Portal hypertensive gastropathy (H)     EGD 10/31/2013     Sensorineural hearing loss      PAST SURGICAL HISTORY:   has a past surgical history that includes inguinal hernia repairs; back surgery to remove bone chip (2005); Esophagoscopy, gastroscopy, duodenoscopy (EGD), combined (10/31/2013); hernia repair; colonoscopy; Phacoemulsification clear cornea with standard intraocular lens implant (Left, 4/3/2015); cataract iol, rt/lt (Left, 04/03/2015); and Phacoemulsification clear cornea with standard intraocular lens implant (Right, 4/24/2015).  FAMILY HISTORY: family history includes Family History Negative in an other family member.  SOCIAL HISTORY:  reports that he quit smoking about 8 years ago. His smoking use included cigarettes. He has quit using smokeless tobacco. He reports that he does not drink alcohol and does not use drugs.    MEDICATIONS:  Current Outpatient Medications   Medication Sig Dispense Refill     acetaminophen (TYLENOL) 325 MG tablet Take 2 tablets (650 mg) by mouth 3 times daily       beclomethasone HFA (QVAR REDIHALER) 80 MCG/ACT inhaler Inhale 1 puff into the lungs 2 times daily       calcium carbonate (TUMS) 500 MG chewable tablet Take 1 tablet (500 mg) by mouth 2 times daily       dexamethasone (DECADRON) 6 MG tablet Take 6 mg by mouth daily For 10 days       diclofenac (VOLTAREN) 1 % GEL Apply 4 gm to affected area four times daily 100 g 2     finasteride (PROSCAR) 5 MG tablet Take 1 tablet (5 mg) by mouth daily       furosemide (LASIX) 20 MG tablet Take 20 mg by mouth daily       guaiFENesin (MUCINEX) 600 MG 12 hr tablet Take 600 mg by mouth 2 times daily       ipratropium - albuterol 0.5 mg/2.5 mg/3 mL (DUONEB) 0.5-2.5 (3) MG/3ML neb solution Take 1 vial  "(3 mLs) by nebulization 4 times daily       ipratropium - albuterol 0.5 mg/2.5 mg/3 mL (DUONEB) 0.5-2.5 (3) MG/3ML nebulization Take 1 vial (3 mLs) by nebulization every 4 hours as needed  2     mineral oil-white petrolatum (EUCERIN) CREA cream Apply topically daily       Multiple Vitamin (TAB-A-AMERICA PO) Take 1 tablet by mouth daily       nystatin (MYCOSTATIN) 167483 UNIT/GM external powder Apply topically 2 times daily       omeprazole (PRILOSEC) 40 MG DR capsule Take 1 capsule (40 mg) by mouth 2 times daily       polyethylene glycol (MIRALAX) 17 g packet Take 17 g by mouth daily as needed for constipation       senna-docusate (SENOKOT-S/PERICOLACE) 8.6-50 MG tablet Take 1 tablet by mouth 2 times daily as needed for constipation       Spacer/Aero-Holding Chambers (MICROCHAMBER) MISC 2 times daily       tamsulosin (FLOMAX) 0.4 MG capsule Take 1 capsule (0.4 mg) by mouth daily       VITAMIN D, CHOLECALCIFEROL, PO Take 50,000 Units by mouth once a week On Wednesday       zinc oxide (DESITIN) 40 % external ointment Apply topically 2 times daily         MED REC REQUIRED  Post Medication Reconciliation Status: medication reconcilation previously completed during another office visit    REVIEW OF SYSTEMS:  Unobtainable secondary to cognitive impairment.     Objective:   BP (!) 165/97   Pulse 86   Temp 97.3  F (36.3  C)   Resp 16   Ht 1.778 m (5' 10\")   Wt 72.6 kg (160 lb)   BMI 22.96 kg/m    GENERAL APPEARANCE:  Alert, in no distress, thin, somnolent, uncooperative, elderly male sitting in recliner in apartment watching television  EYES:  EOM, conjunctivae, lids, pupils and irises normal  RESP: Rhonchi throughout all lobes, cough - harsh, moist, expiratory wheezes, inspiratory wheezes, using accessory muscles  CV:  Palpation and auscultation of heart done , regular rate and rhythm, no murmur, rub, or gallop, no edema  ABDOMEN:  normal bowel sounds, soft, nontender, no hepatosplenomegaly or other masses  LYMPHATICS:  " No adenopathy in neck   M/S:   Gait and station abnormal - JENAE 2/2 patient being in recliner; Digits and nails abnormal - arthritic changes present  SKIN:  Inspection of skin and subcutaneous tissue baseline, Palpation of skin and subcutaneous tissue baseline, skin thin, dry and fragile  PSYCH:  oriented to self, insight and judgement impaired, memory impaired , affect abnormal - withdrawn, guarded    [facility] labs in [facility] reviewed by me today.   CBC RESULTS:   Recent Labs   Lab Test 06/10/22  1420 01/22/15  0820   WBC 6.3 4.1   RBC 4.10* 4.19*   HGB 12.7* 13.8   HCT 39.0* 41.0   MCV 95 98   MCH 31.0 32.9   MCHC 32.6 33.7   RDW 13.2 14.4    88*       Last Basic Metabolic Panel:  Recent Labs   Lab Test 06/10/22  1420 01/22/15  0820    138   POTASSIUM 4.0 4.7   CHLORIDE 96* 107   THA 10.0 9.2   CO2 30 26   BUN 20 27   CR 0.77 0.78    106*       Liver Function Studies -   Recent Labs   Lab Test 05/13/22  1125 01/22/15  0820   PROTTOTAL 8.0 8.8   ALBUMIN 3.6 3.4   BILITOTAL 1.0  1.0 0.7   ALKPHOS 92 89   AST 30  30 Unsatisfactory specimen - hemolyzed   ALT 60*  60* 40       TSH   Date Value Ref Range Status   07/01/2013 0.76 0.4 - 5.0 mU/L Final       Assessment/Plan:    ICD-10-CM    1. COVID-19  U07.1 Acute on chronic - unstable. Patient    2. Chronic obstructive pulmonary disease, unspecified COPD type (H)  J44.9 With increased respiratory concerns as noted in HPI - will obtain a 2 niew CXR   3. Hypoxia  R09.02 And start regimen of Mucinex ER 600mg   4. Pneumonia due to 2019 novel coronavirus  U07.1 PO BID; will also order PT/OT eval/tx    J12.82 For strengthening post-COVID      5. Hepatic cirrhosis, unspecified hepatic cirrhosis type, unspecified whether ascites present (H)  K74.60 Chronic - variable. Slightly increased ALT as shown above - ongoing monitoring of LFTs and avoidance of Hepatotoxic agents.      6. Peripheral vascular disease (H)  I73.9 Chronic - stable. No acute concerns related to  this dx at this time. Ongoing close monitoring of skin.           Electronically signed by:   LAMBERT Stallings, DNP, AGNP-BC, PMHNP-BC  [facility]  Office Hours: Tues-Fri 2913-8131  Office: [address] [place] [address]  Fax - [phone]  Triage Phone- [phone]  Business Office- [phone]      Email: [email or web address]           Sincerely,        LAMBERT Mckeon CNP

## 2023-01-10 NOTE — LETTER
1/10/2023        RE: Ciaran Dong  [address]  [facility] [address]        [facility]    Chief Complaint   [facility]     [facility]     [facility] Record Number:  [id number]  Place of Service where encounter took place:  [facility]) [[id number]]    HPI:  Ciaran Dong is a 72 year old  (1950), who is being seen today for an episodic care visit at: [facility]) [[id number]]. Today's concern is:      Chronic obstructive pulmonary disease, unspecified COPD type (H)  COVID-19  Pneumonia due to 2019 novel coronavirus  MCI (mild cognitive impairment)    Patient seen today for a follow-up visit in [facility]. Patient noncompliant with responses during today's visit - although he does make more eye contact with writer. He frequently turns his attention to RA who is present in his room when I ask questions - she attempts to aide in obtaining a response from him by asking him to answer my questions - he does not. He is seen initially ambulating with his RW with SBA from the dining room - he is able to do this with minimal periods of rest. He does have CHU, but this resolves rather quickly once he is in his recliner. He appears to have ongoing congestion of the upper airways that is heard while breathing - he does not cough up sputum per staff, and does not attempt to do so to clear his throat when asked - he is to complete previously ordered regimen of Mucinex today, as this could still be thinning his baseline secretions. Appetite is at baseline. He is sleeping well per staff report. Denies CP, palpitations, fatigue, nausea, vomiting, increased SOB/CHU, fever, chills, and/or b/b concerns today.    ALLERGIES:Bee venom, Chicken allergy, Influenza vaccines, Acetaminophen, Gluten, Nsaids, Seasonal allergies, Shellfish allergy, Tolmetin, and Triple buffered aspirin [aspirin buffered]  PAST MEDICAL HISTORY:   Past Medical History:   Diagnosis Date      Jarquin's esophagus     associated hiatal hernia     Colon polyps     negative colonoscopy 7/2011 rec F/U in 10 years     Compression fracture     Stable compression fractures L3 and T12     COPD (chronic obstructive pulmonary disease) (H)      Diverticulosis     sigmoid colon     Gastro-oesophageal reflux disease     Barrets     Hepatitis C, chronic (H)     Genotype 3A     Hiatal hernia     Jarquin's esophagus, Upper endoscopy 10/07 Intestinal metaplasiia without dysplasia     Osteoarthritis of lumbar spine     Chronic low back pain, mechanical     Osteoarthritis, hip, bilateral      Osteoporosis      Osteoporosis     lumbar spine on dexa     Portal hypertensive gastropathy (H)     EGD 10/31/2013     Sensorineural hearing loss      PAST SURGICAL HISTORY:   has a past surgical history that includes inguinal hernia repairs; back surgery to remove bone chip (2005); Esophagoscopy, gastroscopy, duodenoscopy (EGD), combined (10/31/2013); hernia repair; colonoscopy; Phacoemulsification clear cornea with standard intraocular lens implant (Left, 4/3/2015); cataract iol, rt/lt (Left, 04/03/2015); and Phacoemulsification clear cornea with standard intraocular lens implant (Right, 4/24/2015).  FAMILY HISTORY: family history includes Family History Negative in an other family member.  SOCIAL HISTORY:  reports that he quit smoking about 8 years ago. His smoking use included cigarettes. He has quit using smokeless tobacco. He reports that he does not drink alcohol and does not use drugs.    MEDICATIONS:  Current Outpatient Medications   Medication Sig Dispense Refill     acetaminophen (TYLENOL) 325 MG tablet Take 2 tablets (650 mg) by mouth 3 times daily       beclomethasone HFA (QVAR REDIHALER) 80 MCG/ACT inhaler Inhale 1 puff into the lungs 2 times daily       calcium carbonate (TUMS) 500 MG chewable tablet Take 1 tablet (500 mg) by mouth 2 times daily       diclofenac (VOLTAREN) 1 % GEL Apply 4 gm to affected area four times  "daily 100 g 2     finasteride (PROSCAR) 5 MG tablet Take 1 tablet (5 mg) by mouth daily       furosemide (LASIX) 20 MG tablet Take 20 mg by mouth daily       ipratropium - albuterol 0.5 mg/2.5 mg/3 mL (DUONEB) 0.5-2.5 (3) MG/3ML neb solution Take 1 vial (3 mLs) by nebulization 4 times daily       ipratropium - albuterol 0.5 mg/2.5 mg/3 mL (DUONEB) 0.5-2.5 (3) MG/3ML nebulization Take 1 vial (3 mLs) by nebulization every 4 hours as needed  2     mineral oil-white petrolatum (EUCERIN) CREA cream Apply topically daily       Multiple Vitamin (TAB-A-AMERIAC PO) Take 1 tablet by mouth daily       nystatin (MYCOSTATIN) 467188 UNIT/GM external powder Apply topically 2 times daily       omeprazole (PRILOSEC) 40 MG DR capsule Take 1 capsule (40 mg) by mouth 2 times daily       polyethylene glycol (MIRALAX) 17 g packet Take 17 g by mouth daily as needed for constipation       senna-docusate (SENOKOT-S/PERICOLACE) 8.6-50 MG tablet Take 1 tablet by mouth 2 times daily as needed for constipation       Spacer/Aero-Holding Chambers (MICROCHAMBER) MISC 2 times daily       tamsulosin (FLOMAX) 0.4 MG capsule Take 1 capsule (0.4 mg) by mouth daily       VITAMIN D, CHOLECALCIFEROL, PO Take 50,000 Units by mouth once a week On Wednesday       zinc oxide (DESITIN) 40 % external ointment Apply topically 2 times daily         MED REC REQUIRED  Post Medication Reconciliation Status: patient was not discharged from an inpatient facility or TCU    REVIEW OF SYSTEMS:  Unobtainable secondary to cognitive impairment.     Objective:   BP (!) 165/97   Pulse 86   Temp 97.3  F (36.3  C)   Resp 16   Ht 1.778 m (5' 10\")   Wt 72.6 kg (160 lb)   SpO2 91%   BMI 22.96 kg/m    GENERAL APPEARANCE:  Alert, in no distress, thin, somnolent, uncooperative, elderly male sitting in recliner in apartment after ambulating from dining room  RESP:  bronchial breath sounds upper airway with increased secretions present, BUL and BLL clear to auscultation  CV:  " Palpation and auscultation of heart done , regular rate and rhythm, no murmur, rub, or gallop, no edema  M/S:   Gait and station abnormal - slow, steady and deliberate with use of RW; Digits and nails abnormal - arthritic changes present  PSYCH:  oriented to self, insight and judgement impaired, memory impaired , affect abnormal - withdrawn, guarded    Recent labs in [facility] reviewed by me today.   CBC RESULTS:   Recent Labs   Lab Test 06/10/22  1420 01/22/15  0820   WBC 6.3 4.1   RBC 4.10* 4.19*   HGB 12.7* 13.8   HCT 39.0* 41.0   MCV 95 98   MCH 31.0 32.9   MCHC 32.6 33.7   RDW 13.2 14.4    88*       Last Basic Metabolic Panel:  Recent Labs   Lab Test 06/10/22  1420 01/22/15  0820    138   POTASSIUM 4.0 4.7   CHLORIDE 96* 107   THA 10.0 9.2   CO2 30 26   BUN 20 27   CR 0.77 0.78    106*       Liver Function Studies -   Recent Labs   Lab Test 05/13/22  1125 01/22/15  0820   PROTTOTAL 8.0 8.8   ALBUMIN 3.6 3.4   BILITOTAL 1.0  1.0 0.7   ALKPHOS 92 89   AST 30  30 Unsatisfactory specimen - hemolyzed   ALT 60*  60* 40       TSH   Date Value Ref Range Status   07/01/2013 0.76 0.4 - 5.0 mU/L Final     CXR 1/6/23:        Assessment/Plan:    ICD-10-CM    1. Chronic obstructive pulmonary disease, unspecified COPD type (H)  J44.9 Chronic - unstable. Exacerbated by below noted diagnoses - non-compliant with use of O2; continue nebulizers as ordered with close monitoring given recent change in respiratory status.   2. COVID-19  U07.1 Acute - unstable. Continues on regimen of   3. Pneumonia due to 2019 novel coronavirus  U07.1 Doxycycline through 1/17/23 - will     J12.82 complete Mucinex today; suspect this is contributing to upper airway secretions - spoke with staff and requested they monitor these secretions for improvement and update me if no improvement noted. Continue abx as ordered with close monitoring.      4. MCI (mild cognitive impairment)  G31.84 Chronic - at resident baseline - makes it  incredibly difficult for communication and assessment as he only responds to certain individuals whom he has known for an extended period of time. Writer to continue to follow-up to build rapport to allow for increased quality of care.        MDM:   Review of the result(s) of each unique test - CXR  Discussion of management or test interpretation with external physician/other qualified healthcare professional/appropriate source - [facility] staff  Diagnosis or treatment significantly limited by social determinants of health - MCI and MH      Electronically signed by:   LAMBERT Stallings, DNP, AGNP-BC, PMHNP-BC  [facility]  Office Hours: Tues-Fri [id number]  Office: [address] [facility] [address] [place] [address]  Fax - [phone]  Triage Phone- [phone]  Business Office- [phone]      Email: [email or web address]           Sincerely,        LAMBERT Mckeon CNP

## 2023-01-10 NOTE — PROGRESS NOTES
[facility]    Chief Complaint   Patient presents with     LILLIE     [place] Medical Record Number:  [id number]  Place of Service where encounter took place:  [facility]) [[id number]]    HPI:  Ciaran Dong is a 72 year old  (1950), who is being seen today for an episodic care visit at: [facility] ([facility]) [[id number]]. Today's concern is:      Chronic obstructive pulmonary disease, unspecified COPD type (H)  COVID-19  Pneumonia due to 2019 novel coronavirus  MCI (mild cognitive impairment)    Patient seen today for a follow-up visit in [facility]. Patient noncompliant with responses during today's visit - although he does make more eye contact with writer. He frequently turns his attention to RA who is present in his room when I ask questions - she attempts to aide in obtaining a response from him by asking him to answer my questions - he does not. He is seen initially ambulating with his RW with SBA from the dining room - he is able to do this with minimal periods of rest. He does have CHU, but this resolves rather quickly once he is in his recliner. He appears to have ongoing congestion of the upper airways that is heard while breathing - he does not cough up sputum per staff, and does not attempt to do so to clear his throat when asked - he is to complete previously ordered regimen of Mucinex today, as this could still be thinning his baseline secretions. Appetite is at baseline. He is sleeping well per staff report. Denies CP, palpitations, fatigue, nausea, vomiting, increased SOB/CHU, fever, chills, and/or b/b concerns today.    ALLERGIES:Bee venom, Chicken allergy, Influenza vaccines, Acetaminophen, Gluten, Nsaids, Seasonal allergies, Shellfish allergy, Tolmetin, and Triple buffered aspirin [aspirin buffered]  PAST MEDICAL HISTORY:   Past Medical History:   Diagnosis Date     Jarquin's esophagus     associated hiatal hernia     Colon polyps     negative colonoscopy 7/2011  rec F/U in 10 years     Compression fracture     Stable compression fractures L3 and T12     COPD (chronic obstructive pulmonary disease) (H)      Diverticulosis     sigmoid colon     Gastro-oesophageal reflux disease     Barrets     Hepatitis C, chronic (H)     Genotype 3A     Hiatal hernia     Jarquin's esophagus, Upper endoscopy 10/07 Intestinal metaplasiia without dysplasia     Osteoarthritis of lumbar spine     Chronic low back pain, mechanical     Osteoarthritis, hip, bilateral      Osteoporosis      Osteoporosis     lumbar spine on dexa     Portal hypertensive gastropathy (H)     EGD 10/31/2013     Sensorineural hearing loss      PAST SURGICAL HISTORY:   has a past surgical history that includes inguinal hernia repairs; back surgery to remove bone chip (2005); Esophagoscopy, gastroscopy, duodenoscopy (EGD), combined (10/31/2013); hernia repair; colonoscopy; Phacoemulsification clear cornea with standard intraocular lens implant (Left, 4/3/2015); cataract iol, rt/lt (Left, 04/03/2015); and Phacoemulsification clear cornea with standard intraocular lens implant (Right, 4/24/2015).  FAMILY HISTORY: family history includes Family History Negative in an other family member.  SOCIAL HISTORY:  reports that he quit smoking about 8 years ago. His smoking use included cigarettes. He has quit using smokeless tobacco. He reports that he does not drink alcohol and does not use drugs.    MEDICATIONS:  Current Outpatient Medications   Medication Sig Dispense Refill     acetaminophen (TYLENOL) 325 MG tablet Take 2 tablets (650 mg) by mouth 3 times daily       beclomethasone HFA (QVAR REDIHALER) 80 MCG/ACT inhaler Inhale 1 puff into the lungs 2 times daily       calcium carbonate (TUMS) 500 MG chewable tablet Take 1 tablet (500 mg) by mouth 2 times daily       diclofenac (VOLTAREN) 1 % GEL Apply 4 gm to affected area four times daily 100 g 2     finasteride (PROSCAR) 5 MG tablet Take 1 tablet (5 mg) by mouth daily        "furosemide (LASIX) 20 MG tablet Take 20 mg by mouth daily       ipratropium - albuterol 0.5 mg/2.5 mg/3 mL (DUONEB) 0.5-2.5 (3) MG/3ML neb solution Take 1 vial (3 mLs) by nebulization 4 times daily       ipratropium - albuterol 0.5 mg/2.5 mg/3 mL (DUONEB) 0.5-2.5 (3) MG/3ML nebulization Take 1 vial (3 mLs) by nebulization every 4 hours as needed  2     mineral oil-white petrolatum (EUCERIN) CREA cream Apply topically daily       Multiple Vitamin (TAB-A-AMERICA PO) Take 1 tablet by mouth daily       nystatin (MYCOSTATIN) 252176 UNIT/GM external powder Apply topically 2 times daily       omeprazole (PRILOSEC) 40 MG DR capsule Take 1 capsule (40 mg) by mouth 2 times daily       polyethylene glycol (MIRALAX) 17 g packet Take 17 g by mouth daily as needed for constipation       senna-docusate (SENOKOT-S/PERICOLACE) 8.6-50 MG tablet Take 1 tablet by mouth 2 times daily as needed for constipation       Spacer/Aero-Holding Chambers (MICROCHAMBER) MISC 2 times daily       tamsulosin (FLOMAX) 0.4 MG capsule Take 1 capsule (0.4 mg) by mouth daily       VITAMIN D, CHOLECALCIFEROL, PO Take 50,000 Units by mouth once a week On Wednesday       zinc oxide (DESITIN) 40 % external ointment Apply topically 2 times daily         MED REC REQUIRED  Post Medication Reconciliation Status: patient was not discharged from an inpatient facility or TCU    REVIEW OF SYSTEMS:  Unobtainable secondary to cognitive impairment.     Objective:   BP (!) 165/97   Pulse 86   Temp 97.3  F (36.3  C)   Resp 16   Ht 1.778 m (5' 10\")   Wt 72.6 kg (160 lb)   SpO2 91%   BMI 22.96 kg/m    GENERAL APPEARANCE:  Alert, in no distress, thin, somnolent, uncooperative, elderly male sitting in recliner in apartment after ambulating from dining room  RESP:  bronchial breath sounds upper airway with increased secretions present, BUL and BLL clear to auscultation  CV:  Palpation and auscultation of heart done , regular rate and rhythm, no murmur, rub, or gallop, no " edema  M/S:   Gait and station abnormal - slow, steady and deliberate with use of RW; Digits and nails abnormal - arthritic changes present  PSYCH:  oriented to self, insight and judgement impaired, memory impaired , affect abnormal - withdrawn, guarded    Recent labs in [facility] reviewed by me today.   CBC RESULTS:   Recent Labs   Lab Test 06/10/22  1420 01/22/15  0820   WBC 6.3 4.1   RBC 4.10* 4.19*   HGB 12.7* 13.8   HCT 39.0* 41.0   MCV 95 98   MCH 31.0 32.9   MCHC 32.6 33.7   RDW 13.2 14.4    88*       Last Basic Metabolic Panel:  Recent Labs   Lab Test 06/10/22  1420 01/22/15  0820    138   POTASSIUM 4.0 4.7   CHLORIDE 96* 107   THA 10.0 9.2   CO2 30 26   BUN 20 27   CR 0.77 0.78    106*       Liver Function Studies -   Recent Labs   Lab Test 05/13/22  1125 01/22/15  0820   PROTTOTAL 8.0 8.8   ALBUMIN 3.6 3.4   BILITOTAL 1.0  1.0 0.7   ALKPHOS 92 89   AST 30  30 Unsatisfactory specimen - hemolyzed   ALT 60*  60* 40       TSH   Date Value Ref Range Status   07/01/2013 0.76 0.4 - 5.0 mU/L Final     CXR 1/6/23:        Assessment/Plan:    ICD-10-CM    1. Chronic obstructive pulmonary disease, unspecified COPD type (H)  J44.9 Chronic - unstable. Exacerbated by below noted diagnoses - non-compliant with use of O2; continue nebulizers as ordered with close monitoring given recent change in respiratory status.   2. COVID-19  U07.1 Acute - unstable. Continues on regimen of   3. Pneumonia due to 2019 novel coronavirus  U07.1 Doxycycline through 1/17/23 - will     J12.82 complete Mucinex today; suspect this is contributing to upper airway secretions - spoke with staff and requested they monitor these secretions for improvement and update me if no improvement noted. Continue abx as ordered with close monitoring.      4. MCI (mild cognitive impairment)  G31.84 Chronic - at resident baseline - makes it incredibly difficult for communication and assessment as he only responds to certain individuals whom he  has known for an extended period of time. Writer to continue to follow-up to build rapport to allow for increased quality of care.        MDM:   Review of the result(s) of each unique test - CXR  Discussion of management or test interpretation with external physician/other qualified healthcare professional/appropriate source - [facility] staff  Diagnosis or treatment significantly limited by social determinants of health - MCI and MH      Electronically signed by:   Dr. Deyanira Cavazos, APRN, DNP, AGNP-BC, PMHNP-BC  [facility]  Office Hours: Tues-Fri 2955-0785  Office: [address] [facility] [address] [place] [address]  Fax - [phone]  Triage Phone- [phone]  Business Office- [phone]      Email: [email or web address]

## 2023-01-13 NOTE — TELEPHONE ENCOUNTER
[facility] Nurse Triage Telephone Note    Provider: LAMBERT Phillips DNP  Facility: [facility]   Facility Type:  [place]    Caller: Mayra ANNA) / Zainab Black RN  Call Back Number: [phone]    Allergies   Allergen Reactions     Bee Venom Anaphylaxis     Chicken Allergy Anaphylaxis     Influenza Vaccines Other (See Comments)     Patient lost consciousness at home after receiving the immunization. He was hospitalized overnight.     Acetaminophen Other (See Comments)     Liver function.     Gluten Nausea and Vomiting     Nsaids      Seasonal Allergies      Shellfish Allergy      Difficulty swallowing     Tolmetin Nausea and Vomiting     Other reaction(s): GI intolerance     Triple Buffered Aspirin [Aspirin Buffered] GI Disturbance       Reason for call: Nurse and Speech Therapist report hearing audible crackles with labored breathing and SOB worse from baseline. Scheduled nebs do not improve the condition of the pt. Lung sounds with crackles throughout. Still has a non-productive cough, finished Mucinex on 1/10. Will finish Doxycycline for PNA on 1/16/23. Responsive only to physical stimuli. No new edema noted.  Vitals: BP:  110/68  P:: 95  R:: 20  SPO2: 92% R/A Temp.:  98.8      Verbal Order/Direction given by Provider:   - Start Mucinex ER 600mg PO BID  - Extend Doxycycline for 4 additional days through Friday 1/20/23  - Decadron 6mg PO daily for 10 days    Provider giving Order:  LAMBERT Phillips DNP    Verbal Order given to: Zainab Maddox RN

## 2023-01-17 NOTE — PROGRESS NOTES
[facility] GERIATRICS    Chief Complaint   Patient presents with     RECHECK     COVID Pneumonia     [facility] Record Number:  [id number]  Place of Service where encounter took place:  [facility] ([facility]) [[id number]]    HPI:  Ciaran Dong is a 72 year old  (1950), who is being seen today for an episodic care visit at: No question data found.. Today's concern is:      Chronic obstructive pulmonary disease, unspecified COPD type (H)  COVID-19  Pneumonia due to 2019 novel coronavirus  MCI (mild cognitive impairment)  Chest congestion    Patient seen today for a follow-up visit in [facility]. Patient continues to be noncompliant with responses during today's visit - he is met with while ambulating from his apartment to the dining room for lunch. Minimal CHU noted; previous coarse breath sounds completely resolved. Staff note improvement in respiratory status overall and cough following steroid burst and extension of Dozycycline regimen. Resident appears to be improving well. Appetite is at baseline. He is sleeping well per staff report. Denies CP, palpitations, fatigue, nausea, vomiting, increased SOB/CHU, fever, chills, and/or b/b concerns today.    ALLERGIES:Bee venom, Chicken allergy, Influenza vaccines, Acetaminophen, Gluten, Nsaids, Seasonal allergies, Shellfish allergy, Tolmetin, and Triple buffered aspirin [aspirin buffered]  PAST MEDICAL HISTORY:   Past Medical History:   Diagnosis Date     Jarquin's esophagus     associated hiatal hernia     Colon polyps     negative colonoscopy 7/2011 rec F/U in 10 years     Compression fracture     Stable compression fractures L3 and T12     COPD (chronic obstructive pulmonary disease) (H)      Diverticulosis     sigmoid colon     Gastro-oesophageal reflux disease     Barrets     Hepatitis C, chronic (H)     Genotype 3A     Hiatal hernia     Jarquin's esophagus, Upper endoscopy 10/07 Intestinal metaplasiia without dysplasia     Osteoarthritis of lumbar  spine     Chronic low back pain, mechanical     Osteoarthritis, hip, bilateral      Osteoporosis      Osteoporosis     lumbar spine on dexa     Portal hypertensive gastropathy (H)     EGD 10/31/2013     Sensorineural hearing loss      PAST SURGICAL HISTORY:   has a past surgical history that includes inguinal hernia repairs; back surgery to remove bone chip (2005); Esophagoscopy, gastroscopy, duodenoscopy (EGD), combined (10/31/2013); hernia repair; colonoscopy; Phacoemulsification clear cornea with standard intraocular lens implant (Left, 4/3/2015); cataract iol, rt/lt (Left, 04/03/2015); and Phacoemulsification clear cornea with standard intraocular lens implant (Right, 4/24/2015).  FAMILY HISTORY: family history includes Family History Negative in an other family member.  SOCIAL HISTORY:  reports that he quit smoking about 8 years ago. His smoking use included cigarettes. He has quit using smokeless tobacco. He reports that he does not drink alcohol and does not use drugs.    MEDICATIONS:  Current Outpatient Medications   Medication Sig Dispense Refill     acetaminophen (TYLENOL) 325 MG tablet Take 2 tablets (650 mg) by mouth 3 times daily       beclomethasone HFA (QVAR REDIHALER) 80 MCG/ACT inhaler Inhale 1 puff into the lungs 2 times daily       calcium carbonate (TUMS) 500 MG chewable tablet Take 1 tablet (500 mg) by mouth 2 times daily       dexamethasone (DECADRON) 6 MG tablet Take 6 mg by mouth daily For 10 days       diclofenac (VOLTAREN) 1 % GEL Apply 4 gm to affected area four times daily 100 g 2     finasteride (PROSCAR) 5 MG tablet Take 1 tablet (5 mg) by mouth daily       furosemide (LASIX) 20 MG tablet Take 20 mg by mouth daily       guaiFENesin (MUCINEX) 600 MG 12 hr tablet Take 600 mg by mouth 2 times daily       ipratropium - albuterol 0.5 mg/2.5 mg/3 mL (DUONEB) 0.5-2.5 (3) MG/3ML neb solution Take 1 vial (3 mLs) by nebulization 4 times daily       ipratropium - albuterol 0.5 mg/2.5 mg/3 mL  (DUONEB) 0.5-2.5 (3) MG/3ML nebulization Take 1 vial (3 mLs) by nebulization every 4 hours as needed  2     mineral oil-white petrolatum (EUCERIN) CREA cream Apply topically daily       Multiple Vitamin (TAB-A-AMERICA PO) Take 1 tablet by mouth daily       nystatin (MYCOSTATIN) 704419 UNIT/GM external powder Apply topically 2 times daily       omeprazole (PRILOSEC) 40 MG DR capsule Take 1 capsule (40 mg) by mouth 2 times daily       polyethylene glycol (MIRALAX) 17 g packet Take 17 g by mouth daily as needed for constipation       senna-docusate (SENOKOT-S/PERICOLACE) 8.6-50 MG tablet Take 1 tablet by mouth 2 times daily as needed for constipation       Spacer/Aero-Holding Chambers (MICROCHAMBER) MISC 2 times daily       tamsulosin (FLOMAX) 0.4 MG capsule Take 1 capsule (0.4 mg) by mouth daily       VITAMIN D, CHOLECALCIFEROL, PO Take 50,000 Units by mouth once a week On Wednesday       zinc oxide (DESITIN) 40 % external ointment Apply topically 2 times daily         MED REC REQUIRED  Post Medication Reconciliation Status: patient was not discharged from an inpatient facility or TCU    REVIEW OF SYSTEMS:  Unobtainable secondary to cognitive impairment.     Objective:   There were no vitals taken for this visit.  GENERAL APPEARANCE:  Alert, in no distress, thin, somnolent, uncooperative, elderly male sitting in recliner in apartment after ambulating from dining room  RESP:  respiratory effort and palpation of chest normal, lungs clear to auscultation , no respiratory distress  CV:  Palpation and auscultation of heart done , regular rate and rhythm, no murmur, rub, or gallop, no edema  M/S:   Gait and station abnormal - slow, steady and deliberate with use of RW; Digits and nails abnormal - arthritic changes present  PSYCH:  oriented to self, insight and judgement impaired, memory impaired , affect abnormal - withdrawn, guarded    Recent labs in [facility] reviewed by me today.   CBC RESULTS:   Recent Labs   Lab Test  06/10/22  1420 01/22/15  0820   WBC 6.3 4.1   RBC 4.10* 4.19*   HGB 12.7* 13.8   HCT 39.0* 41.0   MCV 95 98   MCH 31.0 32.9   MCHC 32.6 33.7   RDW 13.2 14.4    88*       Last Basic Metabolic Panel:  Recent Labs   Lab Test 06/10/22  1420 01/22/15  0820    138   POTASSIUM 4.0 4.7   CHLORIDE 96* 107   THA 10.0 9.2   CO2 30 26   BUN 20 27   CR 0.77 0.78    106*       Liver Function Studies -   Recent Labs   Lab Test 05/13/22  1125 01/22/15  0820   PROTTOTAL 8.0 8.8   ALBUMIN 3.6 3.4   BILITOTAL 1.0  1.0 0.7   ALKPHOS 92 89   AST 30  30 Unsatisfactory specimen - hemolyzed   ALT 60*  60* 40       TSH   Date Value Ref Range Status   07/01/2013 0.76 0.4 - 5.0 mU/L Final     CXR 1/6/23:        Assessment/Plan:    ICD-10-CM    1. Chronic obstructive pulmonary disease, unspecified COPD type (H)  J44.9 Chronic - unstable, improving. Exacerbated by below noted diagnoses - non-compliant with use of O2; stabilzation noted with ongoing use of nebulizers with addition of Decadron regimen. will continue nebulizers as ordered with close monitoring given fragility of respiratory status   2. COVID-19  U07.1 Acute - unstable, improving. Continues on   3. Pneumonia due to 2019 novel coronavirus  U07.1 regimen of Doxycycline through 1/21      J12.82 Writer received call after last week's visit   4. Chest congestion R09.89 Noting ongoing concern with presence of congestion and possible decline is respiratory status - SaO2 levels WNL; congestion severe. Writer treated with Decadron 6mg PO x10 days and extended through the 21st as noted with great improvement as per HPI. Continue current tx regimen as ordered with ongoing close monitoring.         5. MCI (mild cognitive impairment)  G31.84 Chronic - at resident baseline - makes it incredibly difficult for communication and assessment as he only responds to certain individuals whom he has known for an extended period of time. Writer to continue to follow-up to build  rapport to allow for increased quality of care.        MDM:   Discussion of management or test interpretation with external physician/other qualified healthcare professional/appropriate source - [facility] staff  Diagnosis or treatment significantly limited by social determinants of health - MCI and MH  Prescription drug management    Electronically signed by:   Dr. Deyanira Cavazos, APRN, DNP, AGNP-BC, PMHNP-BC  [facility]  Office Hours: Tues-Fri [phone]  Office: [address] [place] [address]  Fax - [phone]  Triage Phone- [phone]  Business Office- [phone]      Email: [email or web address]

## 2023-01-17 NOTE — LETTER
1/17/2023        RE: Ciaran Dong  [address]  \[facility]\"" [address]        [facility] GERIATRICS    Chief Complaint   Patient presents with     RECHECK     COVID Pneumonia     [facility] Medical Record Number:  [id number]  Place of Service where encounter took place:  [facility] ([facility]) [[id number]]    HPI:  Ciaran Dong is a 72 year old  (1950), who is being seen today for an episodic care visit at: No question data found.. Today's concern is:      Chronic obstructive pulmonary disease, unspecified COPD type (H)  COVID-19  Pneumonia due to 2019 novel coronavirus  MCI (mild cognitive impairment)  Chest congestion    Patient seen today for a follow-up visit in [facility]. Patient continues to be noncompliant with responses during today's visit - he is met with while ambulating from his apartment to the dining room for lunch. Minimal CHU noted; previous coarse breath sounds completely resolved. Staff note improvement in respiratory status overall and cough following steroid burst and extension of Dozycycline regimen. Resident appears to be improving well. Appetite is at baseline. He is sleeping well per staff report. Denies CP, palpitations, fatigue, nausea, vomiting, increased SOB/CHU, fever, chills, and/or b/b concerns today.    ALLERGIES:Bee venom, Chicken allergy, Influenza vaccines, Acetaminophen, Gluten, Nsaids, Seasonal allergies, Shellfish allergy, Tolmetin, and Triple buffered aspirin [aspirin buffered]  PAST MEDICAL HISTORY:   Past Medical History:   Diagnosis Date     Jarquin's esophagus     associated hiatal hernia     Colon polyps     negative colonoscopy 7/2011 rec F/U in 10 years     Compression fracture     Stable compression fractures L3 and T12     COPD (chronic obstructive pulmonary disease) (H)      Diverticulosis     sigmoid colon     Gastro-oesophageal reflux disease     Barrets     Hepatitis C, chronic (H)     Genotype 3A     Hiatal hernia     Jarquin's esophagus,  Upper endoscopy 10/07 Intestinal metaplasiia without dysplasia     Osteoarthritis of lumbar spine     Chronic low back pain, mechanical     Osteoarthritis, hip, bilateral      Osteoporosis      Osteoporosis     lumbar spine on dexa     Portal hypertensive gastropathy (H)     EGD 10/31/2013     Sensorineural hearing loss      PAST SURGICAL HISTORY:   has a past surgical history that includes inguinal hernia repairs; back surgery to remove bone chip (2005); Esophagoscopy, gastroscopy, duodenoscopy (EGD), combined (10/31/2013); hernia repair; colonoscopy; Phacoemulsification clear cornea with standard intraocular lens implant (Left, 4/3/2015); cataract iol, rt/lt (Left, 04/03/2015); and Phacoemulsification clear cornea with standard intraocular lens implant (Right, 4/24/2015).  FAMILY HISTORY: family history includes Family History Negative in an other family member.  SOCIAL HISTORY:  reports that he quit smoking about 8 years ago. His smoking use included cigarettes. He has quit using smokeless tobacco. He reports that he does not drink alcohol and does not use drugs.    MEDICATIONS:  Current Outpatient Medications   Medication Sig Dispense Refill     acetaminophen (TYLENOL) 325 MG tablet Take 2 tablets (650 mg) by mouth 3 times daily       beclomethasone HFA (QVAR REDIHALER) 80 MCG/ACT inhaler Inhale 1 puff into the lungs 2 times daily       calcium carbonate (TUMS) 500 MG chewable tablet Take 1 tablet (500 mg) by mouth 2 times daily       dexamethasone (DECADRON) 6 MG tablet Take 6 mg by mouth daily For 10 days       diclofenac (VOLTAREN) 1 % GEL Apply 4 gm to affected area four times daily 100 g 2     finasteride (PROSCAR) 5 MG tablet Take 1 tablet (5 mg) by mouth daily       furosemide (LASIX) 20 MG tablet Take 20 mg by mouth daily       guaiFENesin (MUCINEX) 600 MG 12 hr tablet Take 600 mg by mouth 2 times daily       ipratropium - albuterol 0.5 mg/2.5 mg/3 mL (DUONEB) 0.5-2.5 (3) MG/3ML neb solution Take 1 vial  (3 mLs) by nebulization 4 times daily       ipratropium - albuterol 0.5 mg/2.5 mg/3 mL (DUONEB) 0.5-2.5 (3) MG/3ML nebulization Take 1 vial (3 mLs) by nebulization every 4 hours as needed  2     mineral oil-white petrolatum (EUCERIN) CREA cream Apply topically daily       Multiple Vitamin (TAB-A-AMERICA PO) Take 1 tablet by mouth daily       nystatin (MYCOSTATIN) 933174 UNIT/GM external powder Apply topically 2 times daily       omeprazole (PRILOSEC) 40 MG DR capsule Take 1 capsule (40 mg) by mouth 2 times daily       polyethylene glycol (MIRALAX) 17 g packet Take 17 g by mouth daily as needed for constipation       senna-docusate (SENOKOT-S/PERICOLACE) 8.6-50 MG tablet Take 1 tablet by mouth 2 times daily as needed for constipation       Spacer/Aero-Holding Chambers (MICROCHAMBER) MISC 2 times daily       tamsulosin (FLOMAX) 0.4 MG capsule Take 1 capsule (0.4 mg) by mouth daily       VITAMIN D, CHOLECALCIFEROL, PO Take 50,000 Units by mouth once a week On Wednesday       zinc oxide (DESITIN) 40 % external ointment Apply topically 2 times daily         MED REC REQUIRED  Post Medication Reconciliation Status: patient was not discharged from an inpatient facility or TCU    REVIEW OF SYSTEMS:  Unobtainable secondary to cognitive impairment.     Objective:   There were no vitals taken for this visit.  GENERAL APPEARANCE:  Alert, in no distress, thin, somnolent, uncooperative, elderly male sitting in recliner in apartment after ambulating from dining room  RESP:  respiratory effort and palpation of chest normal, lungs clear to auscultation , no respiratory distress  CV:  Palpation and auscultation of heart done , regular rate and rhythm, no murmur, rub, or gallop, no edema  M/S:   Gait and station abnormal - slow, steady and deliberate with use of RW; Digits and nails abnormal - arthritic changes present  PSYCH:  oriented to self, insight and judgement impaired, memory impaired , affect abnormal - withdrawn,  guarded    Recent labs in [facility] reviewed by me today.   CBC RESULTS:   Recent Labs   Lab Test 06/10/22  1420 01/22/15  0820   WBC 6.3 4.1   RBC 4.10* 4.19*   HGB 12.7* 13.8   HCT 39.0* 41.0   MCV 95 98   MCH 31.0 32.9   MCHC 32.6 33.7   RDW 13.2 14.4    88*       Last Basic Metabolic Panel:  Recent Labs   Lab Test 06/10/22  1420 01/22/15  0820    138   POTASSIUM 4.0 4.7   CHLORIDE 96* 107   THA 10.0 9.2   CO2 30 26   BUN 20 27   CR 0.77 0.78    106*       Liver Function Studies -   Recent Labs   Lab Test 05/13/22  1125 01/22/15  0820   PROTTOTAL 8.0 8.8   ALBUMIN 3.6 3.4   BILITOTAL 1.0  1.0 0.7   ALKPHOS 92 89   AST 30  30 Unsatisfactory specimen - hemolyzed   ALT 60*  60* 40       TSH   Date Value Ref Range Status   07/01/2013 0.76 0.4 - 5.0 mU/L Final     CXR 1/6/23:        Assessment/Plan:    ICD-10-CM    1. Chronic obstructive pulmonary disease, unspecified COPD type (H)  J44.9 Chronic - unstable, improving. Exacerbated by below noted diagnoses - non-compliant with use of O2; stabilzation noted with ongoing use of nebulizers with addition of Decadron regimen. will continue nebulizers as ordered with close monitoring given fragility of respiratory status   2. COVID-19  U07.1 Acute - unstable, improving. Continues on   3. Pneumonia due to 2019 novel coronavirus  U07.1 regimen of Doxycycline through 1/21      J12.82 Writer received call after last week's visit   4. Chest congestion R09.89 Noting ongoing concern with presence of congestion and possible decline is respiratory status - SaO2 levels WNL; congestion severe. Writer treated with Decadron 6mg PO x10 days and extended through the 21st as noted with great improvement as per HPI. Continue current tx regimen as ordered with ongoing close monitoring.         5. MCI (mild cognitive impairment)  G31.84 Chronic - at resident baseline - makes it incredibly difficult for communication and assessment as he only responds to certain individuals  whom he has known for an extended period of time. Writer to continue to follow-up to build rapport to allow for increased quality of care.        MDM:   Discussion of management or test interpretation with external physician/other qualified healthcare professional/appropriate source - [facility] staff  Diagnosis or treatment significantly limited by social determinants of health - MCI and MH  Prescription drug management    Electronically signed by:   Dr. Deyanira Cavazos, LAMBERT, DNP, AGNP-BC, PMHNP-BC  [facility]  Office Hours: Tues-Fri [phone]  Office: [address] [place] [address]  Fax - [phone]  Triage Phone- [phone]  Business Office- [phone]      Email: [email or web address]           Sincerely,        LAMBERT Mckeon CNP

## 2023-01-30 PROBLEM — D59.11: Status: RESOLVED | Noted: 2022-01-01 | Resolved: 2023-01-30

## 2023-01-31 NOTE — PROGRESS NOTES
"[facility] GERIATRICS    Chief Complaint   [facility]     [facility]     [place] [facility] Record Number:  [id number]  Place of Service where encounter took place:  [facility]) [[id number]]    HPI:  Ciaran Dong is a 72 year old  (1950), who is being seen today for an episodic care visit at: [facility] ([facility]) [[id number]]. Today's concern is:      COVID-19  Chronic obstructive pulmonary disease, unspecified COPD type (H)  Hypoxia  Pneumonia due to 2019 novel coronavirus  Hepatic cirrhosis, unspecified hepatic cirrhosis type, unspecified whether ascites present (H)  Peripheral vascular disease (H)    Patient seen today for a follow-up visit in [facility]. Staff note increased SOB, CHU and congestion in resident following recent infection with COVID-18. Patient with minimal verbal response today; does note \"yes\" when asked if he's feeling more SOB. Appetite is at baseline. He is sleeping well. Denies CP, palpitations, fatigue, nausea, vomiting, increased SOB/CHU, fever, chills, and/or b/b concerns today. Staff note increased concerns re:weakness following recent COVID dx - questioning if he may benefit from therapy for strengthening.    ALLERGIES:Bee venom, Chicken allergy, Influenza vaccines, Acetaminophen, Gluten, Nsaids, Seasonal allergies, Shellfish allergy, Tolmetin, and Triple buffered aspirin [aspirin buffered]  PAST MEDICAL HISTORY:   Past Medical History:   Diagnosis Date     Jarquin's esophagus     associated hiatal hernia     Colon polyps     negative colonoscopy 7/2011 rec F/U in 10 years     Compression fracture     Stable compression fractures L3 and T12     COPD (chronic obstructive pulmonary disease) (H)      Diverticulosis     sigmoid colon     Gastro-oesophageal reflux disease     Barrets     Hepatitis C, chronic (H)     Genotype 3A     Hiatal hernia     Jarquin's esophagus, Upper endoscopy 10/07 Intestinal metaplasiia without dysplasia     Osteoarthritis of lumbar " spine     Chronic low back pain, mechanical     Osteoarthritis, hip, bilateral      Osteoporosis      Osteoporosis     lumbar spine on dexa     Portal hypertensive gastropathy (H)     EGD 10/31/2013     Sensorineural hearing loss      PAST SURGICAL HISTORY:   has a past surgical history that includes inguinal hernia repairs; back surgery to remove bone chip (2005); Esophagoscopy, gastroscopy, duodenoscopy (EGD), combined (10/31/2013); hernia repair; colonoscopy; Phacoemulsification clear cornea with standard intraocular lens implant (Left, 4/3/2015); cataract iol, rt/lt (Left, 04/03/2015); and Phacoemulsification clear cornea with standard intraocular lens implant (Right, 4/24/2015).  FAMILY HISTORY: family history includes Family History Negative in an other family member.  SOCIAL HISTORY:  reports that he quit smoking about 8 years ago. His smoking use included cigarettes. He has quit using smokeless tobacco. He reports that he does not drink alcohol and does not use drugs.    MEDICATIONS:  Current Outpatient Medications   Medication Sig Dispense Refill     acetaminophen (TYLENOL) 325 MG tablet Take 2 tablets (650 mg) by mouth 3 times daily       beclomethasone HFA (QVAR REDIHALER) 80 MCG/ACT inhaler Inhale 1 puff into the lungs 2 times daily       calcium carbonate (TUMS) 500 MG chewable tablet Take 1 tablet (500 mg) by mouth 2 times daily       dexamethasone (DECADRON) 6 MG tablet Take 6 mg by mouth daily For 10 days       diclofenac (VOLTAREN) 1 % GEL Apply 4 gm to affected area four times daily 100 g 2     finasteride (PROSCAR) 5 MG tablet Take 1 tablet (5 mg) by mouth daily       furosemide (LASIX) 20 MG tablet Take 20 mg by mouth daily       guaiFENesin (MUCINEX) 600 MG 12 hr tablet Take 600 mg by mouth 2 times daily       ipratropium - albuterol 0.5 mg/2.5 mg/3 mL (DUONEB) 0.5-2.5 (3) MG/3ML neb solution Take 1 vial (3 mLs) by nebulization 4 times daily       ipratropium - albuterol 0.5 mg/2.5 mg/3 mL  "(DUONEB) 0.5-2.5 (3) MG/3ML nebulization Take 1 vial (3 mLs) by nebulization every 4 hours as needed  2     mineral oil-white petrolatum (EUCERIN) CREA cream Apply topically daily       Multiple Vitamin (TAB-A-AMERICA PO) Take 1 tablet by mouth daily       nystatin (MYCOSTATIN) 707966 UNIT/GM external powder Apply topically 2 times daily       omeprazole (PRILOSEC) 40 MG DR capsule Take 1 capsule (40 mg) by mouth 2 times daily       polyethylene glycol (MIRALAX) 17 g packet Take 17 g by mouth daily as needed for constipation       senna-docusate (SENOKOT-S/PERICOLACE) 8.6-50 MG tablet Take 1 tablet by mouth 2 times daily as needed for constipation       Spacer/Aero-Holding Chambers (MICROCHAMBER) MISC 2 times daily       tamsulosin (FLOMAX) 0.4 MG capsule Take 1 capsule (0.4 mg) by mouth daily       VITAMIN D, CHOLECALCIFEROL, PO Take 50,000 Units by mouth once a week On Wednesday       zinc oxide (DESITIN) 40 % external ointment Apply topically 2 times daily         MED REC REQUIRED  Post Medication Reconciliation Status: medication reconcilation previously completed during another office visit    REVIEW OF SYSTEMS:  Unobtainable secondary to cognitive impairment.     Objective:   BP (!) 165/97   Pulse 86   Temp 97.3  F (36.3  C)   Resp 16   Ht 1.778 m (5' 10\")   Wt 72.6 kg (160 lb)   BMI 22.96 kg/m    GENERAL APPEARANCE:  Alert, in no distress, thin, somnolent, uncooperative, elderly male sitting in recliner in apartment watching television  EYES:  EOM, conjunctivae, lids, pupils and irises normal  RESP: Rhonchi throughout all lobes, cough - harsh, moist, expiratory wheezes, inspiratory wheezes, using accessory muscles  CV:  Palpation and auscultation of heart done , regular rate and rhythm, no murmur, rub, or gallop, no edema  ABDOMEN:  normal bowel sounds, soft, nontender, no hepatosplenomegaly or other masses  LYMPHATICS:  No adenopathy in neck   M/S:   Gait and station abnormal - JENAE 2/2 patient being in " recliner; Digits and nails abnormal - arthritic changes present  SKIN:  Inspection of skin and subcutaneous tissue baseline, Palpation of skin and subcutaneous tissue baseline, skin thin, dry and fragile  PSYCH:  oriented to self, insight and judgement impaired, memory impaired , affect abnormal - withdrawn, guarded    Recent labs in [facility] reviewed by me today.   CBC RESULTS:   Recent Labs   Lab Test 06/10/22  1420 01/22/15  0820   WBC 6.3 4.1   RBC 4.10* 4.19*   HGB 12.7* 13.8   HCT 39.0* 41.0   MCV 95 98   MCH 31.0 32.9   MCHC 32.6 33.7   RDW 13.2 14.4    88*       Last Basic Metabolic Panel:  Recent Labs   Lab Test 06/10/22  1420 01/22/15  0820    138   POTASSIUM 4.0 4.7   CHLORIDE 96* 107   THA 10.0 9.2   CO2 30 26   BUN 20 27   CR 0.77 0.78    106*       Liver Function Studies -   Recent Labs   Lab Test 05/13/22  1125 01/22/15  0820   PROTTOTAL 8.0 8.8   ALBUMIN 3.6 3.4   BILITOTAL 1.0  1.0 0.7   ALKPHOS 92 89   AST 30  30 Unsatisfactory specimen - hemolyzed   ALT 60*  60* 40       TSH   Date Value Ref Range Status   07/01/2013 0.76 0.4 - 5.0 mU/L Final       Assessment/Plan:    ICD-10-CM    1. COVID-19  U07.1 Acute on chronic - unstable. Patient    2. Chronic obstructive pulmonary disease, unspecified COPD type (H)  J44.9 With increased respiratory concerns as noted in HPI - will obtain a 2 niew CXR   3. Hypoxia  R09.02 And start regimen of Mucinex ER 600mg   4. Pneumonia due to 2019 novel coronavirus  U07.1 PO BID; will also order PT/OT eval/tx    J12.82 For strengthening post-COVID      5. Hepatic cirrhosis, unspecified hepatic cirrhosis type, unspecified whether ascites present (H)  K74.60 Chronic - variable. Slightly increased ALT as shown above - ongoing monitoring of LFTs and avoidance of Hepatotoxic agents.      6. Peripheral vascular disease (H)  I73.9 Chronic - stable. No acute concerns related to this dx at this time. Ongoing close monitoring of skin.           Electronically  signed by:   Dr. Deyanira Cavazos, APRN, DNP, AGNP-BC, PMHNP-BC  [facility]  Office Hours: Tues-Fri 0319-0223  Office: [address] [place] [address]  Fax - [phone]  Triage Phone- [phone]  Business Office- [phone]      Email: [email or web address]